# Patient Record
Sex: FEMALE | Race: WHITE | Employment: FULL TIME | URBAN - METROPOLITAN AREA
[De-identification: names, ages, dates, MRNs, and addresses within clinical notes are randomized per-mention and may not be internally consistent; named-entity substitution may affect disease eponyms.]

---

## 2017-03-02 ENCOUNTER — OFFICE VISIT (OUTPATIENT)
Dept: ORTHOPEDIC SURGERY | Age: 52
End: 2017-03-02

## 2017-03-02 ENCOUNTER — TELEPHONE (OUTPATIENT)
Dept: ORTHOPEDIC SURGERY | Age: 52
End: 2017-03-02

## 2017-03-02 VITALS
HEART RATE: 98 BPM | HEIGHT: 63 IN | SYSTOLIC BLOOD PRESSURE: 112 MMHG | DIASTOLIC BLOOD PRESSURE: 76 MMHG | BODY MASS INDEX: 23.91 KG/M2 | WEIGHT: 134.92 LBS

## 2017-03-02 DIAGNOSIS — Q76.49 BERTOLOTTI'S SYNDROME: ICD-10-CM

## 2017-03-02 DIAGNOSIS — M53.3 SACROILIAC JOINT DYSFUNCTION OF RIGHT SIDE: ICD-10-CM

## 2017-03-02 DIAGNOSIS — M47.816 SPONDYLOSIS OF LUMBAR REGION WITHOUT MYELOPATHY OR RADICULOPATHY: Primary | ICD-10-CM

## 2017-03-02 PROCEDURE — 99213 OFFICE O/P EST LOW 20 MIN: CPT | Performed by: PHYSICAL MEDICINE & REHABILITATION

## 2017-03-07 ENCOUNTER — HOSPITAL ENCOUNTER (OUTPATIENT)
Dept: PAIN MANAGEMENT | Age: 52
Discharge: HOME | End: 2017-03-07
Attending: PHYSICAL MEDICINE & REHABILITATION | Admitting: PHYSICAL MEDICINE & REHABILITATION

## 2017-03-07 VITALS
DIASTOLIC BLOOD PRESSURE: 86 MMHG | SYSTOLIC BLOOD PRESSURE: 131 MMHG | TEMPERATURE: 98.2 F | HEART RATE: 78 BPM | HEIGHT: 62 IN | BODY MASS INDEX: 24.66 KG/M2 | RESPIRATION RATE: 15 BRPM | WEIGHT: 134 LBS | OXYGEN SATURATION: 100 %

## 2017-03-07 ASSESSMENT — PAIN - FUNCTIONAL ASSESSMENT
PAIN_FUNCTIONAL_ASSESSMENT: 0-10
PAIN_FUNCTIONAL_ASSESSMENT: 0-10

## 2017-03-07 ASSESSMENT — PAIN DESCRIPTION - DESCRIPTORS: DESCRIPTORS: BURNING

## 2017-03-07 ASSESSMENT — ACTIVITIES OF DAILY LIVING (ADL): EFFECT OF PAIN ON DAILY ACTIVITIES: SITTING

## 2017-03-10 ENCOUNTER — TELEPHONE (OUTPATIENT)
Dept: ORTHOPEDIC SURGERY | Age: 52
End: 2017-03-10

## 2017-03-16 ENCOUNTER — TELEPHONE (OUTPATIENT)
Dept: ORTHOPEDIC SURGERY | Age: 52
End: 2017-03-16

## 2017-03-21 ENCOUNTER — HOSPITAL ENCOUNTER (OUTPATIENT)
Dept: PAIN MANAGEMENT | Age: 52
Discharge: HOME | End: 2017-03-21
Attending: PHYSICAL MEDICINE & REHABILITATION | Admitting: PHYSICAL MEDICINE & REHABILITATION

## 2017-03-21 VITALS
TEMPERATURE: 97 F | HEIGHT: 62 IN | BODY MASS INDEX: 24.66 KG/M2 | SYSTOLIC BLOOD PRESSURE: 116 MMHG | OXYGEN SATURATION: 100 % | RESPIRATION RATE: 15 BRPM | WEIGHT: 134 LBS | DIASTOLIC BLOOD PRESSURE: 78 MMHG | HEART RATE: 70 BPM

## 2017-03-21 ASSESSMENT — PAIN - FUNCTIONAL ASSESSMENT: PAIN_FUNCTIONAL_ASSESSMENT: 0-10

## 2017-03-21 ASSESSMENT — PAIN DESCRIPTION - DESCRIPTORS: DESCRIPTORS: BURNING

## 2017-03-24 ENCOUNTER — TELEPHONE (OUTPATIENT)
Dept: ORTHOPEDIC SURGERY | Age: 52
End: 2017-03-24

## 2017-03-27 ENCOUNTER — TELEPHONE (OUTPATIENT)
Dept: ORTHOPEDIC SURGERY | Age: 52
End: 2017-03-27

## 2017-04-04 ENCOUNTER — HOSPITAL ENCOUNTER (OUTPATIENT)
Dept: PAIN MANAGEMENT | Age: 52
Discharge: HOME | End: 2017-04-04
Attending: PHYSICAL MEDICINE & REHABILITATION | Admitting: PHYSICAL MEDICINE & REHABILITATION

## 2017-04-04 VITALS
DIASTOLIC BLOOD PRESSURE: 79 MMHG | SYSTOLIC BLOOD PRESSURE: 121 MMHG | TEMPERATURE: 97.4 F | WEIGHT: 134 LBS | BODY MASS INDEX: 24.66 KG/M2 | HEIGHT: 62 IN | RESPIRATION RATE: 16 BRPM | HEART RATE: 80 BPM | OXYGEN SATURATION: 100 %

## 2017-04-04 LAB — PREGNANCY, URINE: NEGATIVE

## 2017-04-04 RX ORDER — SODIUM CHLORIDE, SODIUM LACTATE, POTASSIUM CHLORIDE, CALCIUM CHLORIDE 600; 310; 30; 20 MG/100ML; MG/100ML; MG/100ML; MG/100ML
INJECTION, SOLUTION INTRAVENOUS
Status: COMPLETED
Start: 2017-04-04 | End: 2017-04-04

## 2017-04-04 RX ORDER — SODIUM CHLORIDE, SODIUM LACTATE, POTASSIUM CHLORIDE, CALCIUM CHLORIDE 600; 310; 30; 20 MG/100ML; MG/100ML; MG/100ML; MG/100ML
INJECTION, SOLUTION INTRAVENOUS CONTINUOUS
Status: DISCONTINUED | OUTPATIENT
Start: 2017-04-04 | End: 2017-04-05 | Stop reason: HOSPADM

## 2017-04-04 RX ORDER — LIDOCAINE HYDROCHLORIDE 10 MG/ML
INJECTION, SOLUTION EPIDURAL; INFILTRATION; INTRACAUDAL; PERINEURAL
Status: COMPLETED
Start: 2017-04-04 | End: 2017-04-04

## 2017-04-04 RX ADMIN — LIDOCAINE HYDROCHLORIDE 0.1 ML: 10 INJECTION, SOLUTION EPIDURAL; INFILTRATION; INTRACAUDAL; PERINEURAL at 07:31

## 2017-04-04 RX ADMIN — SODIUM CHLORIDE, SODIUM LACTATE, POTASSIUM CHLORIDE, CALCIUM CHLORIDE: 600; 310; 30; 20 INJECTION, SOLUTION INTRAVENOUS at 07:31

## 2017-04-04 ASSESSMENT — PAIN DESCRIPTION - DESCRIPTORS: DESCRIPTORS: BURNING

## 2017-04-04 ASSESSMENT — PAIN - FUNCTIONAL ASSESSMENT
PAIN_FUNCTIONAL_ASSESSMENT: 0-10
PAIN_FUNCTIONAL_ASSESSMENT: 0-10

## 2017-04-04 ASSESSMENT — PAIN SCALES - GENERAL: PAINLEVEL_OUTOF10: 0

## 2017-05-11 ENCOUNTER — OFFICE VISIT (OUTPATIENT)
Dept: ORTHOPEDIC SURGERY | Age: 52
End: 2017-05-11

## 2017-05-11 VITALS
BODY MASS INDEX: 26.5 KG/M2 | DIASTOLIC BLOOD PRESSURE: 74 MMHG | WEIGHT: 135 LBS | HEIGHT: 60 IN | SYSTOLIC BLOOD PRESSURE: 127 MMHG

## 2017-05-11 DIAGNOSIS — M53.3 SACROILIAC JOINT DYSFUNCTION OF RIGHT SIDE: ICD-10-CM

## 2017-05-11 DIAGNOSIS — Q76.49 BERTOLOTTI'S SYNDROME: ICD-10-CM

## 2017-05-11 DIAGNOSIS — M47.816 SPONDYLOSIS OF LUMBAR REGION WITHOUT MYELOPATHY OR RADICULOPATHY: Primary | ICD-10-CM

## 2017-05-11 PROCEDURE — 99213 OFFICE O/P EST LOW 20 MIN: CPT | Performed by: PHYSICAL MEDICINE & REHABILITATION

## 2017-11-13 ENCOUNTER — OFFICE VISIT (OUTPATIENT)
Dept: ORTHOPEDIC SURGERY | Age: 52
End: 2017-11-13

## 2017-11-13 VITALS
BODY MASS INDEX: 23.92 KG/M2 | SYSTOLIC BLOOD PRESSURE: 127 MMHG | HEIGHT: 63 IN | WEIGHT: 135 LBS | DIASTOLIC BLOOD PRESSURE: 78 MMHG

## 2017-11-13 DIAGNOSIS — M53.3 SACROILIAC JOINT DYSFUNCTION OF RIGHT SIDE: ICD-10-CM

## 2017-11-13 DIAGNOSIS — M47.816 SPONDYLOSIS OF LUMBAR REGION WITHOUT MYELOPATHY OR RADICULOPATHY: Primary | ICD-10-CM

## 2017-11-13 DIAGNOSIS — Q76.49 BERTOLOTTI'S SYNDROME: ICD-10-CM

## 2017-11-13 DIAGNOSIS — M51.36 DDD (DEGENERATIVE DISC DISEASE), LUMBAR: ICD-10-CM

## 2017-11-13 PROCEDURE — G8420 CALC BMI NORM PARAMETERS: HCPCS | Performed by: PHYSICAL MEDICINE & REHABILITATION

## 2017-11-13 PROCEDURE — G8427 DOCREV CUR MEDS BY ELIG CLIN: HCPCS | Performed by: PHYSICAL MEDICINE & REHABILITATION

## 2017-11-13 PROCEDURE — 1036F TOBACCO NON-USER: CPT | Performed by: PHYSICAL MEDICINE & REHABILITATION

## 2017-11-13 PROCEDURE — 3017F COLORECTAL CA SCREEN DOC REV: CPT | Performed by: PHYSICAL MEDICINE & REHABILITATION

## 2017-11-13 PROCEDURE — 99213 OFFICE O/P EST LOW 20 MIN: CPT | Performed by: PHYSICAL MEDICINE & REHABILITATION

## 2017-11-13 PROCEDURE — 3014F SCREEN MAMMO DOC REV: CPT | Performed by: PHYSICAL MEDICINE & REHABILITATION

## 2017-11-13 PROCEDURE — G8484 FLU IMMUNIZE NO ADMIN: HCPCS | Performed by: PHYSICAL MEDICINE & REHABILITATION

## 2017-11-13 NOTE — ADDENDUM NOTE
Encounter addended by: Yumiko Nieto MA on: 11/13/2017  2:15 PM<BR>    Actions taken: Letter status changed

## 2017-11-13 NOTE — LETTER
Please schedule the following with:     Date:       Account: [de-identified]  Patient: Vera Montague    : 1965  Address:  [place] [address] [address]    Phone (H):  [phone] (home) [phone] (work)     ----------------------------------------------------------------------------------------------  Diagnosis:     ICD-10-CM ICD-9-CM    1. Spondylosis of lumbar region without myelopathy or radiculopathy M47.816 721.3    2. DDD (degenerative disc disease), lumbar M51.36 722.52    3. Bertolotti's syndrome Q76.3 756.15    4. Sacroiliac joint dysfunction of right side M53.3 724.6          Levels: L2, L3, L4, L5 DR  on the right  Neurotomy    ----------------------------------------------------------------------------------------------  Injection #   [email or web address]    Attending Physician       John Douglass.  Karina Kramer MD.      ----------------------------------------------------------------------------------------------  Injection Scheduled For:    At:    1st Insurance:     Pre-Cert#    2nd Insurance:    Pre-Cert#    Comments:    · Infection control  · Tested positive for MRSA in past 12 months:  no  · Tested positive for MSSA \"staph infection\" in past 12 months: no  · Tested positive for VRE (Vancomycin Resistant Enterococci) in past 12 months:   no  · Currently on any antibiotics for an infection: no  · Anticoagulants:  · On a blood thinner:  no   · Any history of bleeding disorder: no   · Advanced Liver disease: no   · Advanced Renal disease: no   · Glaucoma: no   · Diabetes: no     Sedation:  Yes  -----------------------------------------------------------------------------------------------  Allergies   Allergen Reactions    Ibuprofen      Only has one kindney

## 2017-11-13 NOTE — PROGRESS NOTES
Follow up: SPINE      CHIEF COMPLAINT:    Chief Complaint   Patient presents with    Lower Back Pain     F/u LSP. Pt underwent RFA in April 2017. States relief was great until 3-4 weeks ago when pain began to return. No new injuries or symptoms.  Leg Pain     Radiating right leg pain into thigh. Pt notes pain stops at knee. HISTORY OF PRESENT ILLNESS:        The patient is a 47 y.o. female whom reports she underwent radio frequency ablation April 2017. This is undertaken on the right L2, L3-L4 and L5 dorsal ramus. She had 90% relief of her pain up until 3 weeks ago. She feels her pain is completely returned now. Does complain of some pain radiating into the posterior hamstring on the right side. She denies having any weakness or bowel bladder dysfunction. Overall her pain significantly improved after radio frequency ablation. She would like to repeat this. Pain Assessment  Location of Pain: Back (LSP)  Location Modifiers: Posterior  Severity of Pain: 5  Quality of Pain: Other (Comment) (Burning)  Duration of Pain: Persistent  Frequency of Pain: Constant  Aggravating Factors: Bending, Standing, Walking, Other (Comment) (Laying)  Limiting Behavior: Yes  Relieving Factors: Ice  Result of Injury: No  Work-Related Injury: No  Are there other pain locations you wish to document?: No    Associated signs and symptoms:   Neurogenic bowel or bladder symptoms:  no   Perceived weakness:  no   Difficulty walking:  no              Past Medical History:   History reviewed. No pertinent past medical history.    Past Surgical History:     Past Surgical History:   Procedure Laterality Date    KIDNEY DONATION Left     KNEE SURGERY  bilateral    ACL repair      Current Medications:     Current Outpatient Prescriptions:     fluticasone (FLONASE) 50 MCG/ACT nasal spray, , Disp: , Rfl: 11    levocetirizine (XYZAL) 5 MG tablet, Take 5 mg by mouth daily , Disp: , Rfl: 4    montelukast (SINGULAIR) 10 MG tablet, Take 10 mg by mouth daily , Disp: , Rfl: 11    SUMAtriptan (IMITREX) 100 MG tablet, TAKE ONE TABLET BY MOUTH AT START OF A MIGRAINE. MAY REPEAT DOSE IN 2-4 HOURS IF NEEDED. MAX DOSE OF 2 TABLETS IN 24 HOURS, Disp: 9 tablet, Rfl: 0    meclizine (ANTIVERT) 25 MG tablet, TAKE 1 TABLET BY MOUTH THREE TIMES DAILY, Disp: 30 tablet, Rfl: 1  Allergies:  Ibuprofen  Social History:    reports that she has never smoked. She has never used smokeless tobacco. She reports that she drinks alcohol. She reports that she does not use drugs. Family History:   History reviewed. No pertinent family history. REVIEW OF SYSTEMS:   CONSTITUTIONAL: Denies unexplained weight loss, fevers, chills or fatigue  NEUROLOGICAL: Denies unsteady gait or progressive weakness  MUSCULOSKELETAL: Denies joint swelling or redness  GI: Denies nausea, vomiting, diarrhea   : Denies bowel or bladder issues       PHYSICAL EXAM:    Vitals: Blood pressure 127/78, height 5' 3\" (1.6 m), weight 135 lb (61.2 kg). GENERAL EXAM:  · General Apparence: Patient is adequately groomed with no evidence of malnutrition. · Psychiatric: Orientation: The patient is oriented to time, place and person. The patient's mood and affect are appropriate   · Vascular: Examination reveals no swelling and palpation reveals no tenderness in upper or lower extremities. Good capillary refill. · The lymphatic examination of the neck, axillae and groin reveals all areas to be without enlargement or induration  · Sensation is intact without deficit in the upper and lower extremities to light touch and pinprick  · Coordination of the upper and lower extremities are normal.      LUMBAR/SACRAL EXAMINATION:  · Inspection: Local inspection shows no step-off or bruising. Lumbar alignment is normal. No instability is noted. · Palpation:   No evidence of tenderness at the midline. No tenderness bilaterally at the paraspinal or trochanters. There is no paraspinal spasm.   · Range of Motion: Increased pain with facet loading and rotation to the right  · Strength:   Strength testing is 5/5 in all muscle groups tested. · Special Tests:   Straight leg raise and crossed SLR negative. Gabriel's testing is negative bilaterally. FADIR's testing is negative bilaterally. · Skin: There are no rashes, ulcerations or lesions. · Reflexes: Reflexes are symmetrically 2+ at the patellar and ankle tendons. Clonus absent bilaterally at the feet. · Gait & station: normal, patient ambulates without assistance  · Additional Examinations:  · RIGHT LOWER EXTREMITY: Inspection/examination of the right lower extremity does not show any tenderness, deformity or injury. Range of motion is normal and pain-free. There is no gross instability. There are no rashes, ulcerations or lesions. Strength and tone are normal. No atrophy or abnormal movements are noted. · LEFT LOWER EXTREMITY:  Inspection/examination of the left lower extremity does not show any tenderness, deformity or injury. Range of motion is normal and pain-free. There is no gross instability. There are no rashes, ulcerations or lesions. Strength and tone are normal. No atrophy or abnormal movements are noted. Diagnostic Testing:    MR Lumbar spine shows 9/2017  1. Transitional lumbosacral anatomy, as described above. 2. Moderate to severe L4-L5 degenerative disc disease.  Disc bulge and   osteophyte contribute to mild bilateral neural foraminal stenosis, right   greater than left.           Results for orders placed or performed during the hospital encounter of 04/04/17   POC Pregnancy Urine Qual   Result Value Ref Range    Pregnancy, Urine Negative Detects HCG level >20 MIU/mL     Impression:       1. Spondylosis of lumbar region without myelopathy or radiculopathy    2. DDD (degenerative disc disease), lumbar    3. Bertolotti's syndrome    4. Sacroiliac joint dysfunction of right side        Plan:  Clinical Course: Worsening  1.  Medications: Continue anti-inflammatories with appropriate GI Precautions including to stop if develop dark tarry stools or GI upset and to take with food. 2. PT:  Encouraged to continue with HEP. 3. Further studies:  No further studies. 4. Interventional:  We'll set up for repeat radio frequency ablation of the right L2-L3 L4-L5 dorsal ramus. Risks include but not limited to bleeding, infection, increased pain, lack of pain relief and nerve injury. She last underwent radio frequency ablation in April 2017. This gave her greater than 90% relief of her pain for 6 months  5. Follow up:  4-6 weeks          Marsha. Belgica Lester MD, BIANKA, [facility]  Board Certified in [address] [place] [address]  Certified and Fellowship Trained in [facility] ([facility])             This dictation was performed with a verbal recognition program [facility] ) and it was checked for errors. It is possible that there are still dictated errors within this office note. If so, please bring any errors to my attention for an addendum. All efforts were made to ensure that this office note is accurate.

## 2017-12-05 ENCOUNTER — HOSPITAL ENCOUNTER (OUTPATIENT)
Dept: PAIN MANAGEMENT | Age: 52
Discharge: HOME | End: 2017-12-05
Attending: PHYSICAL MEDICINE & REHABILITATION | Admitting: PHYSICAL MEDICINE & REHABILITATION

## 2017-12-05 VITALS
WEIGHT: 130 LBS | OXYGEN SATURATION: 100 % | RESPIRATION RATE: 14 BRPM | HEART RATE: 76 BPM | TEMPERATURE: 97.1 F | BODY MASS INDEX: 23.04 KG/M2 | HEIGHT: 63 IN | SYSTOLIC BLOOD PRESSURE: 130 MMHG | DIASTOLIC BLOOD PRESSURE: 82 MMHG

## 2017-12-05 RX ORDER — SODIUM CHLORIDE, SODIUM LACTATE, POTASSIUM CHLORIDE, CALCIUM CHLORIDE 600; 310; 30; 20 MG/100ML; MG/100ML; MG/100ML; MG/100ML
INJECTION, SOLUTION INTRAVENOUS
Status: COMPLETED
Start: 2017-12-05 | End: 2017-12-05

## 2017-12-05 RX ORDER — SODIUM CHLORIDE, SODIUM LACTATE, POTASSIUM CHLORIDE, CALCIUM CHLORIDE 600; 310; 30; 20 MG/100ML; MG/100ML; MG/100ML; MG/100ML
INJECTION, SOLUTION INTRAVENOUS CONTINUOUS
Status: DISCONTINUED | OUTPATIENT
Start: 2017-12-05 | End: 2017-12-06 | Stop reason: HOSPADM

## 2017-12-05 RX ADMIN — SODIUM CHLORIDE, SODIUM LACTATE, POTASSIUM CHLORIDE, CALCIUM CHLORIDE: 600; 310; 30; 20 INJECTION, SOLUTION INTRAVENOUS at 06:24

## 2017-12-05 ASSESSMENT — PAIN - FUNCTIONAL ASSESSMENT
PAIN_FUNCTIONAL_ASSESSMENT: 0-10
PAIN_FUNCTIONAL_ASSESSMENT: 0-10

## 2017-12-05 ASSESSMENT — ACTIVITIES OF DAILY LIVING (ADL): EFFECT OF PAIN ON DAILY ACTIVITIES: SITTING, STANDING, WALKING

## 2017-12-05 ASSESSMENT — PAIN DESCRIPTION - DESCRIPTORS: DESCRIPTORS: BURNING

## 2017-12-05 NOTE — PROGRESS NOTES
Radiofrequency Ablation   Dr Nj Cover Injection Note    Sight marked/ confirmed with x-ray: Yes  Position: Prone with pillow under head, abdomen and pad under ankles  Prepped with: Chloraprep    Isovue-M-300-3ml  Depomedrol (80 mg/ml)-1ml  Marcaine: 0.5%- 4ml  Lidocaine 2%-4ml  Lidocaine 1%-4ml    Monitor by: Clotilde Blackmon RN  C - Arm operated by: Annika Abarca RT  Tech: LILIYA BOSCH  Prepped by: Dr. Jennifer Burton

## 2018-01-10 ENCOUNTER — OFFICE VISIT (OUTPATIENT)
Dept: ORTHOPEDIC SURGERY | Age: 53
End: 2018-01-10

## 2018-01-10 VITALS
BODY MASS INDEX: 23.05 KG/M2 | SYSTOLIC BLOOD PRESSURE: 127 MMHG | DIASTOLIC BLOOD PRESSURE: 78 MMHG | HEIGHT: 63 IN | WEIGHT: 130.07 LBS

## 2018-01-10 DIAGNOSIS — S76.311A HAMSTRING MUSCLE STRAIN, RIGHT, INITIAL ENCOUNTER: Primary | ICD-10-CM

## 2018-01-10 DIAGNOSIS — M47.816 SPONDYLOSIS OF LUMBAR REGION WITHOUT MYELOPATHY OR RADICULOPATHY: ICD-10-CM

## 2018-01-10 PROCEDURE — 3017F COLORECTAL CA SCREEN DOC REV: CPT | Performed by: PHYSICAL MEDICINE & REHABILITATION

## 2018-01-10 PROCEDURE — G8420 CALC BMI NORM PARAMETERS: HCPCS | Performed by: PHYSICAL MEDICINE & REHABILITATION

## 2018-01-10 PROCEDURE — G8484 FLU IMMUNIZE NO ADMIN: HCPCS | Performed by: PHYSICAL MEDICINE & REHABILITATION

## 2018-01-10 PROCEDURE — 99213 OFFICE O/P EST LOW 20 MIN: CPT | Performed by: PHYSICAL MEDICINE & REHABILITATION

## 2018-01-10 PROCEDURE — 1036F TOBACCO NON-USER: CPT | Performed by: PHYSICAL MEDICINE & REHABILITATION

## 2018-01-10 PROCEDURE — G8427 DOCREV CUR MEDS BY ELIG CLIN: HCPCS | Performed by: PHYSICAL MEDICINE & REHABILITATION

## 2018-01-10 PROCEDURE — 3014F SCREEN MAMMO DOC REV: CPT | Performed by: PHYSICAL MEDICINE & REHABILITATION

## 2018-06-14 ENCOUNTER — TELEPHONE (OUTPATIENT)
Dept: ORTHOPEDIC SURGERY | Age: 53
End: 2018-06-14

## 2018-06-14 ENCOUNTER — OFFICE VISIT (OUTPATIENT)
Dept: ORTHOPEDIC SURGERY | Age: 53
End: 2018-06-14

## 2018-06-14 VITALS
WEIGHT: 135 LBS | HEIGHT: 63 IN | DIASTOLIC BLOOD PRESSURE: 69 MMHG | SYSTOLIC BLOOD PRESSURE: 120 MMHG | BODY MASS INDEX: 23.92 KG/M2

## 2018-06-14 DIAGNOSIS — M47.816 SPONDYLOSIS OF LUMBAR REGION WITHOUT MYELOPATHY OR RADICULOPATHY: Primary | ICD-10-CM

## 2018-06-14 DIAGNOSIS — Q76.49 BERTOLOTTI'S SYNDROME: ICD-10-CM

## 2018-06-14 PROCEDURE — G8420 CALC BMI NORM PARAMETERS: HCPCS | Performed by: PHYSICAL MEDICINE & REHABILITATION

## 2018-06-14 PROCEDURE — G8427 DOCREV CUR MEDS BY ELIG CLIN: HCPCS | Performed by: PHYSICAL MEDICINE & REHABILITATION

## 2018-06-14 PROCEDURE — 99213 OFFICE O/P EST LOW 20 MIN: CPT | Performed by: PHYSICAL MEDICINE & REHABILITATION

## 2018-06-14 PROCEDURE — 3017F COLORECTAL CA SCREEN DOC REV: CPT | Performed by: PHYSICAL MEDICINE & REHABILITATION

## 2018-06-14 PROCEDURE — 1036F TOBACCO NON-USER: CPT | Performed by: PHYSICAL MEDICINE & REHABILITATION

## 2018-06-27 ENCOUNTER — TELEPHONE (OUTPATIENT)
Dept: ORTHOPEDIC SURGERY | Age: 53
End: 2018-06-27

## 2018-06-29 ENCOUNTER — PAT TELEPHONE (OUTPATIENT)
Dept: PREADMISSION TESTING | Age: 53
End: 2018-06-29

## 2018-06-29 RX ORDER — SODIUM CHLORIDE, SODIUM LACTATE, POTASSIUM CHLORIDE, CALCIUM CHLORIDE 600; 310; 30; 20 MG/100ML; MG/100ML; MG/100ML; MG/100ML
INJECTION, SOLUTION INTRAVENOUS CONTINUOUS
Status: CANCELLED | OUTPATIENT
Start: 2018-07-03 | End: 2019-07-02

## 2018-07-03 ENCOUNTER — HOSPITAL ENCOUNTER (OUTPATIENT)
Dept: PAIN MANAGEMENT | Age: 53
Discharge: HOME | End: 2018-07-03
Attending: PHYSICAL MEDICINE & REHABILITATION | Admitting: PHYSICAL MEDICINE & REHABILITATION

## 2018-07-03 VITALS
HEIGHT: 63 IN | OXYGEN SATURATION: 100 % | WEIGHT: 135 LBS | RESPIRATION RATE: 15 BRPM | DIASTOLIC BLOOD PRESSURE: 92 MMHG | HEART RATE: 89 BPM | SYSTOLIC BLOOD PRESSURE: 122 MMHG | TEMPERATURE: 97 F | BODY MASS INDEX: 23.92 KG/M2

## 2018-07-03 RX ORDER — SODIUM CHLORIDE, SODIUM LACTATE, POTASSIUM CHLORIDE, CALCIUM CHLORIDE 600; 310; 30; 20 MG/100ML; MG/100ML; MG/100ML; MG/100ML
INJECTION, SOLUTION INTRAVENOUS CONTINUOUS
Status: DISCONTINUED | OUTPATIENT
Start: 2018-07-03 | End: 2018-07-04 | Stop reason: HOSPADM

## 2018-07-03 RX ORDER — LIDOCAINE HYDROCHLORIDE 10 MG/ML
INJECTION, SOLUTION EPIDURAL; INFILTRATION; INTRACAUDAL; PERINEURAL
Status: COMPLETED
Start: 2018-07-03 | End: 2018-07-03

## 2018-07-03 RX ADMIN — LIDOCAINE HYDROCHLORIDE 0.1 ML: 10 INJECTION, SOLUTION EPIDURAL; INFILTRATION; INTRACAUDAL; PERINEURAL at 07:17

## 2018-07-03 RX ADMIN — SODIUM CHLORIDE, SODIUM LACTATE, POTASSIUM CHLORIDE, CALCIUM CHLORIDE: 600; 310; 30; 20 INJECTION, SOLUTION INTRAVENOUS at 07:18

## 2018-07-03 ASSESSMENT — ACTIVITIES OF DAILY LIVING (ADL): EFFECT OF PAIN ON DAILY ACTIVITIES: ANY ACTIVITY INCREASES PAIN

## 2018-07-03 ASSESSMENT — PAIN - FUNCTIONAL ASSESSMENT
PAIN_FUNCTIONAL_ASSESSMENT: 0-10
PAIN_FUNCTIONAL_ASSESSMENT: 0-10

## 2018-07-03 ASSESSMENT — PAIN DESCRIPTION - DESCRIPTORS: DESCRIPTORS: SHARP;BURNING

## 2018-07-03 NOTE — OP NOTE
Patient:  Carlye Goldberg  YOB: 1965  Medical Record #:  [id number]   Place:   [address] [place], [place]  Date:  7/3/2018   Physician:  Mayi Galaviz MD    Procedure: Radiofrequency ablation of the Right L2, L3, L4 Medial branches and L5 Dorsal ramus     Pre-Procedure Diagnosis: Lumbar spondylosis without radiculopathy     Post-Procedure Diagnosis: Same     Sedation: Local with 1% Lidocaine 5 ml and 1 mg of IV Versed     EBL: None     Complications: None     Procedure Summary:     The patient was seen in the office for complaints of low back pain. Review of the imaging and physical exam of the patient confirmed the pre-procedure diagnosis. After a thorough discussion of risks, benefits and alternatives informed consent was obtained. The patient was brought to the procedure suite and placed in the prone position. The skin overlying the lumbar spine was prepped with chloraprep and draped in the usual sterile fashion. Using fluoroscopic guidance, the right L3, L4, L5 and sacral ala levels were identified. Through anesthetized skin, a, 18 gauge 100 mm RFL needle with a 10 mm active tip was advanced to the juncture of the superior articular process and the transverse process at the right L3 level where the right L2 medial branch resides. After the probe was instilled, motor testing took place up to 2 V without any paresthesia into the right leg. Then ablation took place at 80 C for 90 seconds. This was repeated for the right L4, L5 and sacral ala levels corresponding to the right L3, L4 medial branches and L5 Dorsal ramus. Following the ablation, 20 mg of depomedrol mixed with 0.5% Marcaine was instilled at each level.  The needles were removed and a band-aid was applied at each level.     The patient was transferred to the post-operative area in stable condition.

## 2018-07-03 NOTE — PROGRESS NOTES
RADIOFREQUENY LUMBAR 2-5  DR ROBBY    ISOVUE  LIDOCAINE1%  LIDOCAINE 2%      RAIZA CERVANTES RN  C CHARANJIT WOO  T KIMBERLEY JORGENSEN RN

## 2018-07-03 NOTE — H&P
HISTORY AND PHYSICAL/PRE-SEDATION ASSESSMENT    Patient:  Leatha Romero   :  1965  Medical Record No.:  [id number]   Date:  7/3/2018  Physician:  Sebastián Saldivar M.D. Facility: [address] [place] [address] History and Physical reviewed and agreed upon. Additional findings:    Allergies:  Ibuprofen    Home Medications:    Prior to Admission medications    Medication Sig Start Date End Date Taking? Authorizing Provider   levocetirizine (XYZAL) 5 MG tablet Take 5 mg by mouth daily  11/9/15  Yes Historical Provider, MD   montelukast (SINGULAIR) 10 MG tablet Take 10 mg by mouth daily  11/9/15  Yes Historical Provider, MD   SUMAtriptan (IMITREX) 100 MG tablet TAKE ONE TABLET BY MOUTH AT START OF A MIGRAINE. MAY REPEAT DOSE IN 2-4 HOURS IF NEEDED. MAX DOSE OF 2 TABLETS IN 24 HOURS 10/14/15  Yes Rafael Foy DO   meclizine (ANTIVERT) 25 MG tablet TAKE 1 TABLET BY MOUTH THREE TIMES DAILY 14  Yes Rafael Foy DO   fluticasone [facility]) 50 MCG/ACT nasal spray  9/15/15   Historical Provider, MD       Vitals: Stable       PHYSICAL EXAM:  HENT: Airway patent and reviewed  Cardiovascular: Normal rate, regular rhythm, normal heart sounds. Pulmonary/Chest: No wheezes. No rhonchi. No rales. Abdominal: Soft. Bowel sounds are normal. No distension. ASA CLASS:         []   I. Normal, healthy adult           [x]   II.  Mild systemic disease            []   III. Severe systemic disease      Sedation plan:   [x]  Local              []  Minimal                  []  General anesthesia    Patient's condition acceptable for planned procedure/sedation. Post Procedure Plan   Return to same level of care   ______________________     The risks and benefits as well as alternatives to the procedure have been discussed with the patient and or family. The patient and or next of kin understands and agrees to proceed.     Sebastián Saldivar M.D.

## 2018-10-31 ENCOUNTER — OFFICE VISIT (OUTPATIENT)
Dept: ORTHOPEDIC SURGERY | Age: 53
End: 2018-10-31
Payer: COMMERCIAL

## 2018-10-31 VITALS
WEIGHT: 135 LBS | HEIGHT: 63 IN | BODY MASS INDEX: 23.92 KG/M2 | SYSTOLIC BLOOD PRESSURE: 122 MMHG | DIASTOLIC BLOOD PRESSURE: 68 MMHG

## 2018-10-31 DIAGNOSIS — S76.391A AVULSION OF RIGHT HAMSTRING MUSCLE, INITIAL ENCOUNTER: Primary | ICD-10-CM

## 2018-10-31 DIAGNOSIS — M47.816 SPONDYLOSIS OF LUMBAR REGION WITHOUT MYELOPATHY OR RADICULOPATHY: ICD-10-CM

## 2018-10-31 DIAGNOSIS — M25.512 CHRONIC LEFT SHOULDER PAIN: ICD-10-CM

## 2018-10-31 DIAGNOSIS — S29.019A THORACIC MYOFASCIAL STRAIN, INITIAL ENCOUNTER: ICD-10-CM

## 2018-10-31 DIAGNOSIS — G89.29 CHRONIC LEFT SHOULDER PAIN: ICD-10-CM

## 2018-10-31 PROCEDURE — 99214 OFFICE O/P EST MOD 30 MIN: CPT | Performed by: PHYSICAL MEDICINE & REHABILITATION

## 2018-10-31 PROCEDURE — 3017F COLORECTAL CA SCREEN DOC REV: CPT | Performed by: PHYSICAL MEDICINE & REHABILITATION

## 2018-10-31 PROCEDURE — G8427 DOCREV CUR MEDS BY ELIG CLIN: HCPCS | Performed by: PHYSICAL MEDICINE & REHABILITATION

## 2018-10-31 PROCEDURE — G8420 CALC BMI NORM PARAMETERS: HCPCS | Performed by: PHYSICAL MEDICINE & REHABILITATION

## 2018-10-31 PROCEDURE — G8484 FLU IMMUNIZE NO ADMIN: HCPCS | Performed by: PHYSICAL MEDICINE & REHABILITATION

## 2018-10-31 PROCEDURE — 1036F TOBACCO NON-USER: CPT | Performed by: PHYSICAL MEDICINE & REHABILITATION

## 2018-10-31 NOTE — PROGRESS NOTES
patient is oriented to time, place and person. The patient's mood and affect are appropriate   · Vascular: Examination reveals no swelling and palpation reveals no tenderness in upper or lower extremities. Good capillary refill. · The lymphatic examination of the neck, axillae and groin reveals all areas to be without enlargement or induration  · Sensation is intact without deficit in the upper and lower extremities to light touch and pinprick  · Coordination of the upper and lower extremities are normal.    CERVICAL/THORACIC EXAMINATION:  · Inspection: Local inspection shows no step-off or bruising. Cervical alignment is normal. No instability is noted. · Palpation and Percussion: No evidence of tenderness at the midline. Paraspinal tenderness is present. There is no paraspinal spasm. Skin:There are no rashes, ulcerations or lesions  · Range of Motion:  limited by 50% in all planes due to pain (Tspine)  · Strength: 5/5 bilateral upper extremities  · Special Tests:   Spurling's and Becker's are negative bilaterally. Irene and Impingement tests are negative bilaterally. · Skin:There are no rashes, ulcerations or lesions in right & left upper extremities. · Reflexes: Bilaterally triceps, biceps and brachioradialis are 2+. Clonus absent bilaterally at the feet. No pathological reflexes are noted. · Gait & station: normal, patient ambulates without assistance  · Additional Examinations:  · RIGHT UPPER EXTREMITY:  Inspection/examination of the right upper extremity does not show any tenderness, deformity or injury. Range of motion is unremarkable and pain-free. There is no gross instability. There are no rashes, ulcerations or lesions. Strength and tone are normal. No atrophy or abnormal movements are noted. · LEFT UPPER EXTREMITY: Inspection/examination of the left upper extremity does not show any tenderness, deformity or injury. Range of motion is unremarkable and pain-free.  There is no gross

## 2018-11-07 ENCOUNTER — OFFICE VISIT (OUTPATIENT)
Dept: ORTHOPEDIC SURGERY | Age: 53
End: 2018-11-07
Payer: COMMERCIAL

## 2018-11-07 VITALS
WEIGHT: 135 LBS | DIASTOLIC BLOOD PRESSURE: 70 MMHG | SYSTOLIC BLOOD PRESSURE: 122 MMHG | HEIGHT: 63 IN | BODY MASS INDEX: 23.92 KG/M2

## 2018-11-07 DIAGNOSIS — M76.891 TENDINITIS OF RIGHT HIP FLEXOR: Primary | ICD-10-CM

## 2018-11-07 PROCEDURE — G8420 CALC BMI NORM PARAMETERS: HCPCS | Performed by: PHYSICAL MEDICINE & REHABILITATION

## 2018-11-07 PROCEDURE — G8427 DOCREV CUR MEDS BY ELIG CLIN: HCPCS | Performed by: PHYSICAL MEDICINE & REHABILITATION

## 2018-11-07 PROCEDURE — 3017F COLORECTAL CA SCREEN DOC REV: CPT | Performed by: PHYSICAL MEDICINE & REHABILITATION

## 2018-11-07 PROCEDURE — 1036F TOBACCO NON-USER: CPT | Performed by: PHYSICAL MEDICINE & REHABILITATION

## 2018-11-07 PROCEDURE — G8484 FLU IMMUNIZE NO ADMIN: HCPCS | Performed by: PHYSICAL MEDICINE & REHABILITATION

## 2018-11-07 PROCEDURE — 99213 OFFICE O/P EST LOW 20 MIN: CPT | Performed by: PHYSICAL MEDICINE & REHABILITATION

## 2018-12-18 ENCOUNTER — TELEPHONE (OUTPATIENT)
Dept: ORTHOPEDIC SURGERY | Age: 53
End: 2018-12-18

## 2018-12-26 ENCOUNTER — OFFICE VISIT (OUTPATIENT)
Dept: ORTHOPEDIC SURGERY | Age: 53
End: 2018-12-26
Payer: COMMERCIAL

## 2018-12-26 VITALS
BODY MASS INDEX: 23.92 KG/M2 | HEART RATE: 79 BPM | SYSTOLIC BLOOD PRESSURE: 103 MMHG | WEIGHT: 135 LBS | HEIGHT: 63 IN | DIASTOLIC BLOOD PRESSURE: 68 MMHG

## 2018-12-26 DIAGNOSIS — M25.512 LEFT SHOULDER PAIN, UNSPECIFIED CHRONICITY: Primary | ICD-10-CM

## 2018-12-26 PROCEDURE — 99213 OFFICE O/P EST LOW 20 MIN: CPT | Performed by: ORTHOPAEDIC SURGERY

## 2018-12-26 PROCEDURE — G8427 DOCREV CUR MEDS BY ELIG CLIN: HCPCS | Performed by: ORTHOPAEDIC SURGERY

## 2018-12-26 PROCEDURE — G8420 CALC BMI NORM PARAMETERS: HCPCS | Performed by: ORTHOPAEDIC SURGERY

## 2018-12-26 PROCEDURE — 1036F TOBACCO NON-USER: CPT | Performed by: ORTHOPAEDIC SURGERY

## 2018-12-26 PROCEDURE — 3017F COLORECTAL CA SCREEN DOC REV: CPT | Performed by: ORTHOPAEDIC SURGERY

## 2018-12-26 PROCEDURE — G8484 FLU IMMUNIZE NO ADMIN: HCPCS | Performed by: ORTHOPAEDIC SURGERY

## 2019-01-03 ENCOUNTER — TELEPHONE (OUTPATIENT)
Dept: ORTHOPEDIC SURGERY | Age: 54
End: 2019-01-03

## 2019-01-14 ENCOUNTER — TELEPHONE (OUTPATIENT)
Dept: ORTHOPEDIC SURGERY | Age: 54
End: 2019-01-14

## 2019-01-15 ENCOUNTER — HOSPITAL ENCOUNTER (OUTPATIENT)
Age: 54
Setting detail: OUTPATIENT SURGERY
Discharge: HOME | End: 2019-01-15
Attending: PHYSICAL MEDICINE & REHABILITATION | Admitting: PHYSICAL MEDICINE & REHABILITATION
Payer: COMMERCIAL

## 2019-01-15 VITALS
OXYGEN SATURATION: 100 % | HEART RATE: 78 BPM | HEIGHT: 63 IN | SYSTOLIC BLOOD PRESSURE: 111 MMHG | BODY MASS INDEX: 23.92 KG/M2 | TEMPERATURE: 97.2 F | DIASTOLIC BLOOD PRESSURE: 74 MMHG | RESPIRATION RATE: 14 BRPM | WEIGHT: 135 LBS

## 2019-01-15 PROCEDURE — 99152 MOD SED SAME PHYS/QHP 5/>YRS: CPT | Performed by: PHYSICAL MEDICINE & REHABILITATION

## 2019-01-15 PROCEDURE — 99153 MOD SED SAME PHYS/QHP EA: CPT | Performed by: PHYSICAL MEDICINE & REHABILITATION

## 2019-01-15 PROCEDURE — 2500000003 HC RX 250 WO HCPCS: Performed by: PHYSICAL MEDICINE & REHABILITATION

## 2019-01-15 PROCEDURE — 2580000003 HC RX 258: Performed by: PHYSICAL MEDICINE & REHABILITATION

## 2019-01-15 PROCEDURE — 2709999900 HC NON-CHARGEABLE SUPPLY: Performed by: PHYSICAL MEDICINE & REHABILITATION

## 2019-01-15 PROCEDURE — 7100000011 HC PHASE II RECOVERY - ADDTL 15 MIN: Performed by: PHYSICAL MEDICINE & REHABILITATION

## 2019-01-15 PROCEDURE — 3600000012 HC SURGERY LEVEL 2 ADDTL 15MIN: Performed by: PHYSICAL MEDICINE & REHABILITATION

## 2019-01-15 PROCEDURE — 6360000004 HC RX CONTRAST MEDICATION: Performed by: PHYSICAL MEDICINE & REHABILITATION

## 2019-01-15 PROCEDURE — 3600000002 HC SURGERY LEVEL 2 BASE: Performed by: PHYSICAL MEDICINE & REHABILITATION

## 2019-01-15 PROCEDURE — 7100000010 HC PHASE II RECOVERY - FIRST 15 MIN: Performed by: PHYSICAL MEDICINE & REHABILITATION

## 2019-01-15 PROCEDURE — 6360000002 HC RX W HCPCS: Performed by: PHYSICAL MEDICINE & REHABILITATION

## 2019-01-15 RX ORDER — SODIUM CHLORIDE, SODIUM LACTATE, POTASSIUM CHLORIDE, CALCIUM CHLORIDE 600; 310; 30; 20 MG/100ML; MG/100ML; MG/100ML; MG/100ML
INJECTION, SOLUTION INTRAVENOUS ONCE
Status: COMPLETED | OUTPATIENT
Start: 2019-01-15 | End: 2019-01-15

## 2019-01-15 RX ORDER — BUPIVACAINE HYDROCHLORIDE 5 MG/ML
INJECTION, SOLUTION EPIDURAL; INTRACAUDAL PRN
Status: DISCONTINUED | OUTPATIENT
Start: 2019-01-15 | End: 2019-01-15 | Stop reason: HOSPADM

## 2019-01-15 RX ORDER — LIDOCAINE HYDROCHLORIDE 20 MG/ML
INJECTION, SOLUTION EPIDURAL; INFILTRATION; INTRACAUDAL; PERINEURAL PRN
Status: DISCONTINUED | OUTPATIENT
Start: 2019-01-15 | End: 2019-01-15 | Stop reason: HOSPADM

## 2019-01-15 RX ORDER — METHYLPREDNISOLONE ACETATE 80 MG/ML
INJECTION, SUSPENSION INTRA-ARTICULAR; INTRALESIONAL; INTRAMUSCULAR; SOFT TISSUE PRN
Status: DISCONTINUED | OUTPATIENT
Start: 2019-01-15 | End: 2019-01-15 | Stop reason: HOSPADM

## 2019-01-15 RX ORDER — MIDAZOLAM HYDROCHLORIDE 1 MG/ML
INJECTION INTRAMUSCULAR; INTRAVENOUS PRN
Status: DISCONTINUED | OUTPATIENT
Start: 2019-01-15 | End: 2019-01-15 | Stop reason: HOSPADM

## 2019-01-15 RX ADMIN — LIDOCAINE HYDROCHLORIDE 0.1 ML: 10 INJECTION, SOLUTION EPIDURAL; INFILTRATION; INTRACAUDAL; PERINEURAL at 07:16

## 2019-01-15 RX ADMIN — SODIUM CHLORIDE, POTASSIUM CHLORIDE, SODIUM LACTATE AND CALCIUM CHLORIDE: 600; 310; 30; 20 INJECTION, SOLUTION INTRAVENOUS at 07:16

## 2019-01-15 ASSESSMENT — PAIN - FUNCTIONAL ASSESSMENT
PAIN_FUNCTIONAL_ASSESSMENT: 0-10
PAIN_FUNCTIONAL_ASSESSMENT: PREVENTS OR INTERFERES WITH MANY ACTIVE NOT PASSIVE ACTIVITIES

## 2019-01-15 ASSESSMENT — PAIN DESCRIPTION - DESCRIPTORS: DESCRIPTORS: BURNING

## 2019-01-15 ASSESSMENT — PAIN SCALES - GENERAL: PAINLEVEL_OUTOF10: 0

## 2019-07-11 ENCOUNTER — OFFICE VISIT (OUTPATIENT)
Dept: ORTHOPEDIC SURGERY | Age: 54
End: 2019-07-11
Payer: COMMERCIAL

## 2019-07-11 VITALS
SYSTOLIC BLOOD PRESSURE: 120 MMHG | HEIGHT: 63 IN | BODY MASS INDEX: 23.91 KG/M2 | WEIGHT: 134.92 LBS | DIASTOLIC BLOOD PRESSURE: 80 MMHG

## 2019-07-11 DIAGNOSIS — M47.816 SPONDYLOSIS OF LUMBAR REGION WITHOUT MYELOPATHY OR RADICULOPATHY: Primary | ICD-10-CM

## 2019-07-11 DIAGNOSIS — M51.36 DDD (DEGENERATIVE DISC DISEASE), LUMBAR: ICD-10-CM

## 2019-07-11 DIAGNOSIS — M48.061 LUMBAR FORAMINAL STENOSIS: ICD-10-CM

## 2019-07-11 PROCEDURE — 1036F TOBACCO NON-USER: CPT | Performed by: PHYSICAL MEDICINE & REHABILITATION

## 2019-07-11 PROCEDURE — 3017F COLORECTAL CA SCREEN DOC REV: CPT | Performed by: PHYSICAL MEDICINE & REHABILITATION

## 2019-07-11 PROCEDURE — 99214 OFFICE O/P EST MOD 30 MIN: CPT | Performed by: PHYSICAL MEDICINE & REHABILITATION

## 2019-07-11 PROCEDURE — G8420 CALC BMI NORM PARAMETERS: HCPCS | Performed by: PHYSICAL MEDICINE & REHABILITATION

## 2019-07-11 PROCEDURE — G8427 DOCREV CUR MEDS BY ELIG CLIN: HCPCS | Performed by: PHYSICAL MEDICINE & REHABILITATION

## 2019-07-11 NOTE — PROGRESS NOTES
Inspection/examination of the right upper extremity does not show any tenderness, deformity or injury. Range of motion is unremarkable and pain-free. There is no gross instability. There are no rashes, ulcerations or lesions. Strength and tone are normal. No atrophy or abnormal movements are noted. · LEFT UPPER EXTREMITY: Inspection/examination of the left upper extremity does not show any tenderness, deformity or injury. Range of motion is unremarkable and pain-free. There is no gross instability. There are no rashes, ulcerations or lesions. Strength and tone are normal. No atrophy or abnormal movements are noted. LUMBAR/SACRAL EXAMINATION:  · Inspection: Local inspection shows no step-off or bruising. Lumbar alignment is normal. No instability is noted. · Palpation:   No evidence of tenderness at the midline. Lumbar paraspinal tenderness: Mild L4/5 and L5/S1 tenderness  Bursal tenderness No tenderness bilaterally  There is no paraspinal spasm. · Range of Motion: Increased pain facet loading to the right side  · Strength:   Strength testing is 5/5 in all muscle groups tested. · Special Tests:   Straight leg raise and crossed SLR negative. Gabriel's testing is negative bilaterally. FADIR's testing is negative bilaterally. · Skin: There are no rashes, ulcerations or lesions. · Reflexes: Reflexes are symmetrically 2+ at the patellar and ankle tendons. Clonus absent bilaterally at the feet. · Gait & station: normal, patient ambulates without assistance and no ataxia  · Additional Examinations:  · RIGHT LOWER EXTREMITY: Inspection/examination of the right lower extremity does not show any tenderness, deformity or injury. Range of motion is normal and pain-free. There is no gross instability. There are no rashes, ulcerations or lesions. Strength and tone are normal. No atrophy or abnormal movements are noted.   · LEFT LOWER EXTREMITY:  Inspection/examination of the left lower extremity does not show any symptoms worsen or if new symptoms appear prior to the next scheduled visit. She is specifically instructed to contact the office between now & her scheduled appointment if she has concerns related to her condition or if she needs assistance in scheduling the above tests. She is welcome to call for an appointment sooner if she has any additional concerns or questions. I, Dr. Familia Cabral. Angela, personally performed the services described in this documentation as scribed by Nohemy Okeefe MA, in my presence and it is both accurate and complete. Nadia Martinez. Gloria Ely MD, BIANKA, [facility]  Board Certified in [address] [place] [address]  Certified and Fellowship Trained in [facility] ([facility])             This dictation was performed with a verbal recognition program [facility]) and it was checked for errors. It is possible that there are still dictated errors within this office note. If so, please bring any errors to my attention for an addendum. All efforts were made to ensure that this office note is accurate.

## 2019-08-02 ENCOUNTER — TELEPHONE (OUTPATIENT)
Dept: ORTHOPEDIC SURGERY | Age: 54
End: 2019-08-02

## 2019-08-06 ENCOUNTER — HOSPITAL ENCOUNTER (OUTPATIENT)
Age: 54
Setting detail: OUTPATIENT SURGERY
Discharge: HOME | End: 2019-08-06
Attending: PHYSICAL MEDICINE & REHABILITATION | Admitting: PHYSICAL MEDICINE & REHABILITATION
Payer: COMMERCIAL

## 2019-08-06 VITALS
SYSTOLIC BLOOD PRESSURE: 132 MMHG | BODY MASS INDEX: 23.74 KG/M2 | HEART RATE: 73 BPM | RESPIRATION RATE: 20 BRPM | WEIGHT: 134 LBS | OXYGEN SATURATION: 100 % | TEMPERATURE: 97.4 F | DIASTOLIC BLOOD PRESSURE: 77 MMHG | HEIGHT: 63 IN

## 2019-08-06 PROCEDURE — 2500000003 HC RX 250 WO HCPCS: Performed by: PHYSICAL MEDICINE & REHABILITATION

## 2019-08-06 PROCEDURE — 6360000002 HC RX W HCPCS: Performed by: PHYSICAL MEDICINE & REHABILITATION

## 2019-08-06 PROCEDURE — 2709999900 HC NON-CHARGEABLE SUPPLY: Performed by: PHYSICAL MEDICINE & REHABILITATION

## 2019-08-06 PROCEDURE — 7100000010 HC PHASE II RECOVERY - FIRST 15 MIN: Performed by: PHYSICAL MEDICINE & REHABILITATION

## 2019-08-06 PROCEDURE — 3600000012 HC SURGERY LEVEL 2 ADDTL 15MIN: Performed by: PHYSICAL MEDICINE & REHABILITATION

## 2019-08-06 PROCEDURE — 99153 MOD SED SAME PHYS/QHP EA: CPT | Performed by: PHYSICAL MEDICINE & REHABILITATION

## 2019-08-06 PROCEDURE — 7100000011 HC PHASE II RECOVERY - ADDTL 15 MIN: Performed by: PHYSICAL MEDICINE & REHABILITATION

## 2019-08-06 PROCEDURE — 99152 MOD SED SAME PHYS/QHP 5/>YRS: CPT | Performed by: PHYSICAL MEDICINE & REHABILITATION

## 2019-08-06 PROCEDURE — 3600000002 HC SURGERY LEVEL 2 BASE: Performed by: PHYSICAL MEDICINE & REHABILITATION

## 2019-08-06 PROCEDURE — 2580000003 HC RX 258: Performed by: PHYSICAL MEDICINE & REHABILITATION

## 2019-08-06 RX ORDER — MIDAZOLAM HYDROCHLORIDE 1 MG/ML
INJECTION INTRAMUSCULAR; INTRAVENOUS PRN
Status: DISCONTINUED | OUTPATIENT
Start: 2019-08-06 | End: 2019-08-06 | Stop reason: ALTCHOICE

## 2019-08-06 RX ORDER — LIDOCAINE HYDROCHLORIDE 20 MG/ML
INJECTION, SOLUTION EPIDURAL; INFILTRATION; INTRACAUDAL; PERINEURAL PRN
Status: DISCONTINUED | OUTPATIENT
Start: 2019-08-06 | End: 2019-08-06 | Stop reason: ALTCHOICE

## 2019-08-06 RX ORDER — ROSUVASTATIN CALCIUM 5 MG/1
5 TABLET, COATED ORAL DAILY
COMMUNITY

## 2019-08-06 RX ORDER — LIDOCAINE HYDROCHLORIDE 10 MG/ML
INJECTION, SOLUTION INFILTRATION; PERINEURAL PRN
Status: DISCONTINUED | OUTPATIENT
Start: 2019-08-06 | End: 2019-08-06 | Stop reason: ALTCHOICE

## 2019-08-06 RX ORDER — SODIUM CHLORIDE, SODIUM LACTATE, POTASSIUM CHLORIDE, CALCIUM CHLORIDE 600; 310; 30; 20 MG/100ML; MG/100ML; MG/100ML; MG/100ML
INJECTION, SOLUTION INTRAVENOUS ONCE
Status: COMPLETED | OUTPATIENT
Start: 2019-08-06 | End: 2019-08-06

## 2019-08-06 RX ADMIN — LIDOCAINE HYDROCHLORIDE 0.1 ML: 10 INJECTION, SOLUTION EPIDURAL; INFILTRATION; INTRACAUDAL; PERINEURAL at 07:20

## 2019-08-06 RX ADMIN — SODIUM CHLORIDE, POTASSIUM CHLORIDE, SODIUM LACTATE AND CALCIUM CHLORIDE: 600; 310; 30; 20 INJECTION, SOLUTION INTRAVENOUS at 07:20

## 2019-08-06 ASSESSMENT — PAIN SCALES - GENERAL
PAINLEVEL_OUTOF10: 1
PAINLEVEL_OUTOF10: 0

## 2019-08-06 ASSESSMENT — PAIN DESCRIPTION - PAIN TYPE: TYPE: CHRONIC PAIN

## 2019-08-06 ASSESSMENT — PAIN - FUNCTIONAL ASSESSMENT
PAIN_FUNCTIONAL_ASSESSMENT: 0-10
PAIN_FUNCTIONAL_ASSESSMENT: PREVENTS OR INTERFERES SOME ACTIVE ACTIVITIES AND ADLS

## 2019-08-06 ASSESSMENT — PAIN DESCRIPTION - FREQUENCY: FREQUENCY: CONTINUOUS

## 2019-08-06 ASSESSMENT — PAIN DESCRIPTION - ONSET: ONSET: ON-GOING

## 2019-08-06 ASSESSMENT — PAIN DESCRIPTION - DESCRIPTORS: DESCRIPTORS: BURNING

## 2019-08-06 NOTE — OP NOTE
Patient:  Rito Whitman  YOB: 1965  Medical Record #:  [id number]   Place:   [address], [place]  Date:  8/6/2019   Physician:  Virginia Mckeon MD, BIANKA    Procedure: Radiofrequency ablation of the Right L2, L3, L4 Medial branches and L5 Dorsal ramus     Pre-Procedure Diagnosis: Lumbar spondylosis without radiculopathy     Post-Procedure Diagnosis: Same     Sedation: Local with 1% Lidocaine 5 ml and 2 mg of IV Versed     EBL: None     Complications: None     Procedure Summary:     The patient was seen in the office for complaints of low back pain. Review of the imaging and physical exam of the patient confirmed the pre-procedure diagnosis. After a thorough discussion of risks, benefits and alternatives informed consent was obtained. The patient was brought to the procedure suite and placed in the prone position. The skin overlying the lumbar spine was prepped with chloraprep and draped in the usual sterile fashion. Using fluoroscopic guidance, the right L3,  L4, L5 and sacral ala levels were identified. Through anesthetized skin, a 20 gauge 10 mm RFL needle with a 10 mm active tip was advanced to the juncture of the superior articular process and the transverse process at the right L3 level where the right L2 medial branch resides. After the probe was instilled, motor testing took place up to 2 V without any paresthesia into the right leg. Then ablation took place at 80 C for 90 seconds. This was repeated for the right L4 , L5 and sacral ala levels corresponding to the right L3 and L4 medial branches and L5 Dorsal ramus.  The needles were removed and a band-aid was applied at each level.     The patient was transferred to the post-operative area in stable condition.

## 2019-08-06 NOTE — PROGRESS NOTES
Patient arrived in Post op phase 2 on cart. Report from Josefa Green RN. Site of injection without redness, drainage or bleeding. Patient denies numbness, tingling or weakness. Moves extremities x 4.

## 2019-08-06 NOTE — H&P
HISTORY AND PHYSICAL/PRE-SEDATION ASSESSMENT    Patient:  Arron Abdullahi   :  1965  Medical Record No.:  [id number]   Date:  2019  Physician:  Macey Payne M.D. Facility: [facility]    HISTORY OF PRESENT ILLNESS:                 The patient is a 53 y.o. female whom presents with lower back pain. Review of the imaging and physical exam of the patient confirmed the pre-procedure diagnosis. After a thorough discussion of risks, benefits and alternatives informed consent was obtained. Past Medical History:   Past Medical History:   Diagnosis Date    Hyperlipemia     Migraines       Past Surgical History:     Past Surgical History:   Procedure Laterality Date    KIDNEY DONATION Left     KNEE SURGERY  bilateral    ACL repair     NERVE SURGERY Right 1/15/2019    RIGHT LUMBAR TWO, LUMBAR THREE, LUMBAR FOUR, LUMBAR FIVE DORSAL RAMUS RADIOFREQUENCY ABLATION SITE CONFIRMED BY FLUOROSCOPY performed by Macey Payne MD at [place] [address]     Current Medications:   Prior to Admission medications    Medication Sig Start Date End Date Taking? Authorizing Provider   Erenumab-aooe (AIMOVIG 140 DOSE) 70 MG/ML SOAJ Inject 140 mg into the skin every 30 days   Yes Historical Provider, MD   rosuvastatin (CRESTOR) 5 MG tablet Take 5 mg by mouth daily   Yes Historical Provider, MD   montelukast (SINGULAIR) 10 MG tablet Take 10 mg by mouth daily  11/9/15  Yes Historical Provider, MD   meclizine (ANTIVERT) 25 MG tablet TAKE 1 TABLET BY MOUTH THREE TIMES DAILY 14  Yes Archie Carrel, DO   fluticasone [facility]) 50 MCG/ACT nasal spray  9/15/15   Historical Provider, MD   levocetirizine (XYZAL) 5 MG tablet Take 5 mg by mouth daily  11/9/15   Historical Provider, MD     Allergies:  Ibuprofen  Social History:    reports that she has never smoked. She has never used smokeless tobacco. She reports that she drinks alcohol. She reports that she does not use drugs.   Family History:   No family

## 2019-09-06 ENCOUNTER — OFFICE VISIT (OUTPATIENT)
Dept: ORTHOPEDIC SURGERY | Age: 54
End: 2019-09-06
Payer: COMMERCIAL

## 2019-09-06 VITALS
HEIGHT: 63 IN | WEIGHT: 130 LBS | RESPIRATION RATE: 16 BRPM | DIASTOLIC BLOOD PRESSURE: 72 MMHG | BODY MASS INDEX: 23.04 KG/M2 | HEART RATE: 86 BPM | SYSTOLIC BLOOD PRESSURE: 113 MMHG

## 2019-09-06 DIAGNOSIS — M47.816 SPONDYLOSIS OF LUMBAR REGION WITHOUT MYELOPATHY OR RADICULOPATHY: Primary | ICD-10-CM

## 2019-09-06 DIAGNOSIS — M51.36 DDD (DEGENERATIVE DISC DISEASE), LUMBAR: ICD-10-CM

## 2019-09-06 DIAGNOSIS — M48.061 LUMBAR FORAMINAL STENOSIS: ICD-10-CM

## 2019-09-06 PROCEDURE — 99213 OFFICE O/P EST LOW 20 MIN: CPT | Performed by: PHYSICIAN ASSISTANT

## 2019-09-06 PROCEDURE — G8420 CALC BMI NORM PARAMETERS: HCPCS | Performed by: PHYSICIAN ASSISTANT

## 2019-09-06 PROCEDURE — 1036F TOBACCO NON-USER: CPT | Performed by: PHYSICIAN ASSISTANT

## 2019-09-06 PROCEDURE — G8427 DOCREV CUR MEDS BY ELIG CLIN: HCPCS | Performed by: PHYSICIAN ASSISTANT

## 2019-09-06 PROCEDURE — 3017F COLORECTAL CA SCREEN DOC REV: CPT | Performed by: PHYSICIAN ASSISTANT

## 2020-01-27 ENCOUNTER — TELEPHONE (OUTPATIENT)
Dept: ORTHOPEDIC SURGERY | Age: 55
End: 2020-01-27

## 2020-01-27 NOTE — TELEPHONE ENCOUNTER
L/M FOR PATIENT that request for procedure was received online. However, patient has not been seen since 9/6/2019, therefore she would need to be seen in the office for follow up evaluation. Requested she contact the office to schedule an office visit.

## 2020-01-27 NOTE — TELEPHONE ENCOUNTER
Patient made an online request through patient pop for a RFA. Patient would like it scheduled on 2/6.  # [phone]

## 2020-02-06 ENCOUNTER — OFFICE VISIT (OUTPATIENT)
Dept: ORTHOPEDIC SURGERY | Age: 55
End: 2020-02-06
Payer: COMMERCIAL

## 2020-02-06 VITALS
HEIGHT: 63 IN | BODY MASS INDEX: 23.05 KG/M2 | WEIGHT: 130.07 LBS | DIASTOLIC BLOOD PRESSURE: 67 MMHG | SYSTOLIC BLOOD PRESSURE: 97 MMHG | HEART RATE: 84 BPM

## 2020-02-06 PROCEDURE — 1036F TOBACCO NON-USER: CPT | Performed by: PHYSICIAN ASSISTANT

## 2020-02-06 PROCEDURE — G8420 CALC BMI NORM PARAMETERS: HCPCS | Performed by: PHYSICIAN ASSISTANT

## 2020-02-06 PROCEDURE — G8484 FLU IMMUNIZE NO ADMIN: HCPCS | Performed by: PHYSICIAN ASSISTANT

## 2020-02-06 PROCEDURE — 99214 OFFICE O/P EST MOD 30 MIN: CPT | Performed by: PHYSICIAN ASSISTANT

## 2020-02-06 PROCEDURE — 3017F COLORECTAL CA SCREEN DOC REV: CPT | Performed by: PHYSICIAN ASSISTANT

## 2020-02-06 PROCEDURE — G8427 DOCREV CUR MEDS BY ELIG CLIN: HCPCS | Performed by: PHYSICIAN ASSISTANT

## 2020-02-06 NOTE — PROGRESS NOTES
Past Medical History:   Past Medical History:   Diagnosis Date    Hyperlipemia     Migraines       Past Surgical History:     Past Surgical History:   Procedure Laterality Date    KIDNEY DONATION Left     KNEE SURGERY  bilateral    ACL repair     NERVE SURGERY Right 1/15/2019    RIGHT LUMBAR TWO, LUMBAR THREE, LUMBAR FOUR, LUMBAR FIVE DORSAL RAMUS RADIOFREQUENCY ABLATION SITE CONFIRMED BY FLUOROSCOPY performed by Meme Raphael MD at [address] Right 8/6/2019    RIGHT LUMBAR TWO, LUMBAR THREE, LUMBAR FOUR, LUMBAR FIVE DORSAL RAMUS RADIOFREQUENCY ABLATION SITE CONFIRMED BY FLUOROSCOPY performed by Meme Raphael MD at [address] Right 2/18/2020    RIGHT LUMBAR TWO, LUMBAR THREE, LUMBAR FOUR, LUMBAR FIVE DORSAL RAMUS RADIOFREQUENCY ABLATION SITE CONFIRMED BY FLUOROSCOPY performed by Meme Raphael MD at [address]     Current Medications:     Current Outpatient Medications:     Erenumab-aooe (AIMOVIG 140 DOSE) 70 MG/ML SOAJ, Inject 140 mg into the skin every 30 days, Disp: , Rfl:     rosuvastatin (CRESTOR) 5 MG tablet, Take 5 mg by mouth daily, Disp: , Rfl:     fluticasone (FLONASE) 50 MCG/ACT nasal spray, , Disp: , Rfl: 11    levocetirizine (XYZAL) 5 MG tablet, Take 5 mg by mouth daily , Disp: , Rfl: 4    montelukast (SINGULAIR) 10 MG tablet, Take 10 mg by mouth daily , Disp: , Rfl: 11    meclizine (ANTIVERT) 25 MG tablet, TAKE 1 TABLET BY MOUTH THREE TIMES DAILY, Disp: 30 tablet, Rfl: 1  Allergies:  Ibuprofen  Social History:    reports that she has never smoked. She has never used smokeless tobacco. She reports current alcohol use. She reports that she does not use drugs.   Family History:   Family History   Problem Relation Age of Onset    Osteoarthritis Mother     Osteoarthritis Father        REVIEW OF SYSTEMS:   CONSTITUTIONAL: Denies unexplained weight loss, fevers, chills or fatigue  NEUROLOGICAL: Denies unsteady gait or progressive weakness  MUSCULOSKELETAL: Denies joint swelling or redness  GI: Denies nausea, vomiting, diarrhea   : Denies bowel or bladder issues       PHYSICAL EXAM:    Vitals: Blood pressure 97/67, pulse 84, height 5' 2.99\" (1.6 m), weight 130 lb 1.1 oz (59 kg), last menstrual period 08/04/2016, not currently breastfeeding. GENERAL EXAM:  · General Apparence: Patient is adequately groomed with no evidence of malnutrition. · Psychiatric: Orientation: The patient is oriented to time, place and person. The patient's mood and affect are appropriate   · Vascular: Examination reveals no swelling and palpation reveals no tenderness in upper or lower extremities. Good capillary refill. · The lymphatic examination of the neck, axillae and groin reveals all areas to be without enlargement or induration  · Sensation is intact without deficit in the upper and lower extremities to light touch and pinprick  · Coordination of the upper and lower extremities are normal.  · RIGHT UPPER EXTREMITY:  Inspection/examination of the right upper extremity does not show any tenderness, deformity or injury. Range of motion is unremarkable and pain-free. There is no gross instability. There are no rashes, ulcerations or lesions. Strength and tone are normal. No atrophy or abnormal movements are noted. · LEFT UPPER EXTREMITY: Inspection/examination of the left upper extremity does not show any tenderness, deformity or injury. Range of motion is unremarkable and pain-free. There is no gross instability. There are no rashes, ulcerations or lesions. Strength and tone are normal. No atrophy or abnormal movements are noted. LUMBAR/SACRAL EXAMINATION:  · Inspection: Local inspection shows no step-off or bruising. Lumbar alignment is normal. No instability is noted. · Palpation:   No evidence of tenderness at the midline. Lumbar paraspinal tenderness: Mild L4/5 and L5/S1 tenderness right side.  Bursal tenderness No tenderness bilaterally  There is no paraspinal spasm. · Range of Motion: limited by 50% in all planes due to pain specifically with extension and facet loading to the right. · Strength:   Strength testing is 5/5 in all muscle groups tested. · Special Tests:   Straight leg raise and crossed SLR negative. Gabriel's testing is negative bilaterally. FADIR's testing is negative bilaterally. Bowstring test negative. Slump test negative. · Skin: There are no rashes, ulcerations or lesions. · Reflexes: Reflexes are symmetrically 1+ at the patellar and ankle tendons. Clonus absent bilaterally at the feet. · Gait & station: normal, patient ambulates without assistance and no ataxia  · Additional Examinations:  · RIGHT LOWER EXTREMITY: Inspection/examination of the right lower extremity does not show any tenderness, deformity or injury. Range of motion is normal and pain-free. There is no gross instability. There are no rashes, ulcerations or lesions. Strength and tone are normal. No atrophy or abnormal movements are noted. · LEFT LOWER EXTREMITY:  Inspection/examination of the left lower extremity does not show any tenderness, deformity or injury. Range of motion is normal and pain-free. There is no gross instability. There are no rashes, ulcerations or lesions. Strength and tone are normal. No atrophy or abnormal movements are noted. Diagnostic Testing:    No new diagnostics  Results for orders placed or performed during the hospital encounter of 04/04/17   POC Pregnancy Urine Qual   Result Value Ref Range    Pregnancy, Urine Negative Detects HCG level >20 MIU/mL     Impression:       1. Spondylosis of lumbar region without myelopathy or radiculopathy    2. DDD (degenerative disc disease), lumbar    3. Lumbar foraminal stenosis        Plan:  Clinical Course: Above diagnoses are worsening    I discussed the diagnosis and the treatment options with Rocío Strauss today.      In Summary:  The various treatment options were outlined and discussed with Rojas Shine including:  Conservative care options: physical therapy, ice, medications, bracing, and activity modification. The indications for therapeutic injections. The indications for additional imaging/laboratory studies. The indications for (possible future) interventions. After considering the various options discussed, Rojas Shine elected to pursue a course of treatment that includes the followin. Medications:  No further recommendations for new medications. 2. PT:  Encouraged to continue with Home exercise program.    3. Further studies: No further studies. 4. Interventional:  We have discussed options such as radiofrequency ablation of the lumbar spine at the right L2, L3, L4, L5 DR level, risks include but limited to bleeding, infection, neuritis, increased pain, lack of pain relief, motor nerve injury. This does not include all possible risks. The patient verbalized understanding would like to proceed. Side effects and benefits were also discussed. The patient had a successful radiofrequency ablation from 2019 at 95% relief. 5. Follow up:  4-6 weeks      Rojas Shine was instructed to call the office if her symptoms worsen or if new symptoms appear prior to the next scheduled visit. She is specifically instructed to contact the office between now & her scheduled appointment if she has concerns related to her condition or if she needs assistance in scheduling the above tests. She is welcome to call for an appointment sooner if she has any additional concerns or questions. Luna ERICKSON ATC, am scribing for and in the presence of HEATH Chilel.  20 1:55 PM Luna Rose. The physical examination was performed between the patient and HEATH Chilel. All counseling during the appointment was performed between the patient and the provider. Haley Watson PA-C, personally performed the services described in

## 2020-02-06 NOTE — LETTER
Please schedule the following with:     Date:   20 @ 7:30     Account: [de-identified]  Patient: Flavio Simon    : 1965  Address:  [address] [address]    Phone (H):  [phone] (home) [phone] (work)     ----------------------------------------------------------------------------------------------  Diagnosis:     ICD-10-CM    1. Spondylosis of lumbar region without myelopathy or radiculopathy M47.816    2. DDD (degenerative disc disease), lumbar M51.36    3. Lumbar foraminal stenosis M48.061      Procedure: Radiofrequency Ablation Lumbar     Levels: L2, L3, L4, L5 DR  Side: Right   CPT Codes F121778, 50642    ----------------------------------------------------------------------------------------------  Injection # [id number]  [email or web address]    Attending Physician       Jacky Donovan. Lori Ramirez MD.  ----------------------------------------------------------------------------------------------  Injection Scheduled For:    At:    [facility] [address]    Pre-Cert#    2nd Insurance     Pre-Cert#    Comments:     · Infection control  ? Tested positive for MRSA in past 12 months:  no  ? Tested positive for MSSA \"staph infection\" in past 12 months: no  ? Tested positive for VRE (Vancomycin Resistant Enterococci) in past 12 months:   no  ? Currently on any antibiotics for an infection: no  · Anticoagulants:  ? On a blood thinner:  no   ?  Any history of bleeding disorder: no   · Advanced Liver disease: no   · Advanced Renal disease: no   · Glaucoma: no   · Diabetes: no      Sedation:         Yes  -----------------------------------------------------------------------------------------------  Allergies   Allergen Reactions    Ibuprofen      Only has one kindney

## 2020-02-17 ENCOUNTER — TELEPHONE (OUTPATIENT)
Dept: ORTHOPEDIC SURGERY | Age: 55
End: 2020-02-17

## 2020-02-17 NOTE — TELEPHONE ENCOUNTER
DOS   02/18/2020  CPT   47951  24189  05913.26  81398  DX   M47.816  M51.36  M48.061  OP SX AUTH  [id number]  VALID   02/18/2020 - 05/18/2020    RIGHT  LEVELS   L2  L3  L4  L5   PROCEDURE   NERUOTOMY  DR. Marylin Esquivel  [facility]  INSURANCE:   [facility]

## 2020-02-18 ENCOUNTER — HOSPITAL ENCOUNTER (OUTPATIENT)
Age: 55
Setting detail: OUTPATIENT SURGERY
Discharge: HOME | End: 2020-02-18
Attending: PHYSICAL MEDICINE & REHABILITATION | Admitting: PHYSICAL MEDICINE & REHABILITATION
Payer: COMMERCIAL

## 2020-02-18 VITALS
SYSTOLIC BLOOD PRESSURE: 119 MMHG | HEART RATE: 85 BPM | TEMPERATURE: 97.4 F | BODY MASS INDEX: 23.04 KG/M2 | OXYGEN SATURATION: 100 % | HEIGHT: 63 IN | WEIGHT: 130 LBS | DIASTOLIC BLOOD PRESSURE: 71 MMHG | RESPIRATION RATE: 19 BRPM

## 2020-02-18 PROCEDURE — 2500000003 HC RX 250 WO HCPCS: Performed by: PHYSICAL MEDICINE & REHABILITATION

## 2020-02-18 PROCEDURE — 6360000002 HC RX W HCPCS: Performed by: PHYSICAL MEDICINE & REHABILITATION

## 2020-02-18 PROCEDURE — 2580000003 HC RX 258: Performed by: PHYSICAL MEDICINE & REHABILITATION

## 2020-02-18 PROCEDURE — 7100000010 HC PHASE II RECOVERY - FIRST 15 MIN: Performed by: PHYSICAL MEDICINE & REHABILITATION

## 2020-02-18 PROCEDURE — 3600000012 HC SURGERY LEVEL 2 ADDTL 15MIN: Performed by: PHYSICAL MEDICINE & REHABILITATION

## 2020-02-18 PROCEDURE — 7100000011 HC PHASE II RECOVERY - ADDTL 15 MIN: Performed by: PHYSICAL MEDICINE & REHABILITATION

## 2020-02-18 PROCEDURE — 2709999900 HC NON-CHARGEABLE SUPPLY: Performed by: PHYSICAL MEDICINE & REHABILITATION

## 2020-02-18 PROCEDURE — 3600000002 HC SURGERY LEVEL 2 BASE: Performed by: PHYSICAL MEDICINE & REHABILITATION

## 2020-02-18 RX ORDER — LIDOCAINE HYDROCHLORIDE 10 MG/ML
INJECTION, SOLUTION INFILTRATION; PERINEURAL PRN
Status: DISCONTINUED | OUTPATIENT
Start: 2020-02-18 | End: 2020-02-18 | Stop reason: ALTCHOICE

## 2020-02-18 RX ORDER — SODIUM CHLORIDE, SODIUM LACTATE, POTASSIUM CHLORIDE, CALCIUM CHLORIDE 600; 310; 30; 20 MG/100ML; MG/100ML; MG/100ML; MG/100ML
INJECTION, SOLUTION INTRAVENOUS ONCE
Status: COMPLETED | OUTPATIENT
Start: 2020-02-18 | End: 2020-02-18

## 2020-02-18 RX ORDER — LIDOCAINE HYDROCHLORIDE 20 MG/ML
INJECTION, SOLUTION EPIDURAL; INFILTRATION; INTRACAUDAL; PERINEURAL PRN
Status: DISCONTINUED | OUTPATIENT
Start: 2020-02-18 | End: 2020-02-18 | Stop reason: ALTCHOICE

## 2020-02-18 RX ADMIN — SODIUM CHLORIDE, POTASSIUM CHLORIDE, SODIUM LACTATE AND CALCIUM CHLORIDE: 600; 310; 30; 20 INJECTION, SOLUTION INTRAVENOUS at 07:04

## 2020-02-18 RX ADMIN — LIDOCAINE HYDROCHLORIDE 0.1 ML: 10 INJECTION, SOLUTION EPIDURAL; INFILTRATION; INTRACAUDAL; PERINEURAL at 07:04

## 2020-02-18 ASSESSMENT — PAIN DESCRIPTION - DESCRIPTORS: DESCRIPTORS: BURNING

## 2020-02-18 ASSESSMENT — PAIN - FUNCTIONAL ASSESSMENT
PAIN_FUNCTIONAL_ASSESSMENT: PREVENTS OR INTERFERES SOME ACTIVE ACTIVITIES AND ADLS
PAIN_FUNCTIONAL_ASSESSMENT: 0-10

## 2020-02-18 NOTE — PROGRESS NOTES
Discharge instructions given to patient and family member. Verbalized understanding. No new pain, numbness, or weakness. Discharged in stable condition. Patient to car via wheelchair.

## 2020-02-18 NOTE — H&P
HISTORY AND PHYSICAL/PRE-SEDATION ASSESSMENT    Patient:  Marco A Henriquez   :  1965  Medical Record No.:  [id number]   Date:  2020  Physician:  Rebecca Veras M.D. Facility: [facility]    HISTORY OF PRESENT ILLNESS:                 The patient is a 53 y.o. female whom presents with lower back pain right sided. Review of the imaging and physical exam of the patient confirmed the pre-procedure diagnosis. After a thorough discussion of risks, benefits and alternatives informed consent was obtained. Past Medical History:   Past Medical History:   Diagnosis Date    Hyperlipemia     Migraines       Past Surgical History:     Past Surgical History:   Procedure Laterality Date    KIDNEY DONATION Left     KNEE SURGERY  bilateral    ACL repair     NERVE SURGERY Right 1/15/2019    RIGHT LUMBAR TWO, LUMBAR THREE, LUMBAR FOUR, LUMBAR FIVE DORSAL RAMUS RADIOFREQUENCY ABLATION SITE CONFIRMED BY FLUOROSCOPY performed by Rebecca Veras MD at [facility] Right 2019    RIGHT LUMBAR TWO, LUMBAR THREE, LUMBAR FOUR, LUMBAR FIVE DORSAL RAMUS RADIOFREQUENCY ABLATION SITE CONFIRMED BY FLUOROSCOPY performed by Rebecca Veras MD at [place] [address]     Current Medications:   Prior to Admission medications    Medication Sig Start Date End Date Taking?  Authorizing Provider   rosuvastatin (CRESTOR) 5 MG tablet Take 5 mg by mouth daily   Yes Historical Provider, MD   montelukast (SINGULAIR) 10 MG tablet Take 10 mg by mouth daily  11/9/15  Yes Historical Provider, MD   meclizine (ANTIVERT) 25 MG tablet TAKE 1 TABLET BY MOUTH THREE TIMES DAILY 14  Yes Gustavo Shall, DO   Erenumab-aooe (AIMOVIG 140 DOSE) 70 MG/ML SOAJ Inject 140 mg into the skin every 30 days    Historical Provider, MD   fluticasone [facility]) 50 MCG/ACT nasal spray  9/15/15   Historical Provider, MD   levocetirizine (XYZAL) 5 MG tablet Take 5 mg by mouth daily  11/9/15   Historical Provider, MD

## 2020-02-18 NOTE — OP NOTE
Patient:  Ekaterina Ramirez  YOB: 1965  Medical Record #:  [id number]   Place:   [address], [place]  Date:  2/18/2020   Physician:  Henok Bob MD, BIANKA    Procedure: Radiofrequency ablation of the Right L2, L3, L4 Medial branches and L5 Dorsal ramus    CPT 65240 [id number]     Pre-Procedure Diagnosis: Lumbar spondylosis without radiculopathy     Post-Procedure Diagnosis: Same     Sedation: Local with 1% Lidocaine 5 ml      EBL: None     Complications: None     Procedure Summary:     The patient was seen in the office for complaints of low back pain. Review of the imaging and physical exam of the patient confirmed the pre-procedure diagnosis. After a thorough discussion of risks, benefits and alternatives informed consent was obtained. The patient was brought to the procedure suite and placed in the prone position. The skin overlying the lumbar spine was prepped with chloraprep and draped in the usual sterile fashion. Using fluoroscopic guidance, the right L3, L4, L5 and sacral ala levels were identified. Through anesthetized skin, a 20 gauge 100 mm RFL needle with a 10 mm active tip was advanced to the juncture of the superior articular process and the transverse process at the right L3 level where the right L2 medial branch resides. After the probe was instilled, motor testing took place up to 2 V without any paresthesia into the right leg. Then ablation took place at 80 C for 90 seconds. This was repeated for the right L5 and sacral ala levels corresponding to the right L3 and L4 medial branches and L5 Dorsal ramus. Following the ablation, 80 mg of depomedrol mixed with 0.5% Marcaine was instilled in 1/6th aliquots at each level. The needles were removed and a band-aid was applied at each level. The patient was transferred to the post-operative area in stable condition.

## 2020-07-06 ENCOUNTER — TELEPHONE (OUTPATIENT)
Dept: ORTHOPEDIC SURGERY | Age: 55
End: 2020-07-06

## 2020-07-07 NOTE — TELEPHONE ENCOUNTER
S/W PATIENT to follow up from 2/18/2020 P RFA. She states she has been doing well but would like to schedule a follow up appointment. Patient has been scheduled based on preferences.

## 2020-07-24 ENCOUNTER — VIRTUAL VISIT (OUTPATIENT)
Dept: ORTHOPEDIC SURGERY | Age: 55
End: 2020-07-24
Payer: COMMERCIAL

## 2020-07-24 PROCEDURE — G8427 DOCREV CUR MEDS BY ELIG CLIN: HCPCS | Performed by: PHYSICAL MEDICINE & REHABILITATION

## 2020-07-24 PROCEDURE — 99213 OFFICE O/P EST LOW 20 MIN: CPT | Performed by: PHYSICAL MEDICINE & REHABILITATION

## 2020-07-24 PROCEDURE — 3017F COLORECTAL CA SCREEN DOC REV: CPT | Performed by: PHYSICAL MEDICINE & REHABILITATION

## 2020-07-24 NOTE — LETTER
Please schedule the following with:     Date:   20 @ 7:45   Account: [de-identified]  Patient: Gauri Smith    : 1965  Address:  [address] [address]    Phone (H):  [phone] (home) [phone] (work)     ----------------------------------------------------------------------------------------------  Diagnosis:     ICD-10-CM    1. DDD (degenerative disc disease), lumbar  M51.36    2. Lumbar foraminal stenosis  M48.061    3. Spondylosis of lumbar region without myelopathy or radiculopathy  M47.816          Levels:Right L2, L3, L4 and L5 DR lumbar medial branch RFA  Procedure type RADIOFREQUENCY ABLATION  Side RIGHT  CPT Codes 39178, 25535    ----------------------------------------------------------------------------------------------  Injection #    [id number]    Attending Physician       Lexx Stone.  Elizabeth Prather MD.  ----------------------------------------------------------------------------------------------  Injection Scheduled For:    At:    [address] [facility]    Pre-Cert#    2nd Insurance     Pre-Cert#    Comments or Special instructions:    · Infection control  · Tested positive for MRSA in past 12 months:  no  · Tested positive for MSSA \"staph infection\" in past 12 months: no  · Tested positive for VRE (Vancomycin Resistant Enterococci) in past 12 months:   no  · Currently on any antibiotics for an infection: no  · Anticoagulants:  · On a blood thinner:  no   · Any history of bleeding disorder: no   · Advanced Liver disease: no   · Advanced Renal disease: no   · Glaucoma: no   · Diabetes: no     Sedation:  Yes   -----------------------------------------------------------------------------------------------  Allergies   Allergen Reactions    Ibuprofen      Only has one kindney

## 2020-07-24 NOTE — PROGRESS NOTES
Follow up: [facility] visit (Audio/visual connection present)    Cortney Theodore  1965  [id number]         Chief Complaint   Patient presents with    Lower Back Pain     2/18/2020; RFA R L2, L3, L4 MB L5          HISTORY OF PRESENT ILLNESS:  Ms. Gt Armenta is a 63 y.o. female returns for a follow up visit for multiple medical problems. Her current presenting problems are   1. Spondylosis of lumbar region without myelopathy or radiculopathy    2. DDD (degenerative disc disease), lumbar    3. Lumbar foraminal stenosis    . As per information/history obtained from the PADT(patient assessment and documentation tool) - She complains of pain in the lower back with radiation to the hips Right and upper leg Right She rates the pain 7/10 and describes it as sharp, aching, throbbing, burning, stabbing. Pain is made worse by: movement, walking, sitting. She denies side effects from the current pain regimen. Patient reports that since the last follow up visit the physical functioning is better, family/social relationships are better, mood is better and sleep patterns are better, and that the overall functioning is better. Patient denies neurological bowel or bladder. Patient returns following a radiofrequency ablation to right L2, L3, L4 medial branches and L5 dorsal ramus on 2/18/2020. She reports experiencing 100% improvement of her symptoms for roughly 4 months and now 80% relief. The pain is still not as severe as it was previously but is progressively increasing. She continues to use ice and this does help intermittently. Her right hip pain does wake her up at night. There is some mild left sided low back and left hip discomfort. She did sustain a fall in April and saw a chiropractor who took radiographs. 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      Associated signs and symptoms:   Neurogenic bowel or bladder symptoms:  no   Perceived weakness:  no   Difficulty walking:  yes            Past medical, surgical, social and family history reviewed with the patient. No pertinent relevant history  Past Medical History:   Past Medical History:   Diagnosis Date    Hyperlipemia     Migraines       Past Surgical History:     Past Surgical History:   Procedure Laterality Date    KIDNEY DONATION Left     KNEE SURGERY  bilateral    ACL repair     NERVE SURGERY Right 1/15/2019    RIGHT LUMBAR TWO, LUMBAR THREE, LUMBAR FOUR, LUMBAR FIVE DORSAL RAMUS RADIOFREQUENCY ABLATION SITE CONFIRMED BY FLUOROSCOPY performed by Lainey Mosqueda MD at [address] Right 8/6/2019    RIGHT LUMBAR TWO, LUMBAR THREE, LUMBAR FOUR, LUMBAR FIVE DORSAL RAMUS RADIOFREQUENCY ABLATION SITE CONFIRMED BY FLUOROSCOPY performed by Lainey Mosqueda MD at [address] Right 2/18/2020    RIGHT LUMBAR TWO, LUMBAR THREE, LUMBAR FOUR, LUMBAR FIVE DORSAL RAMUS RADIOFREQUENCY ABLATION SITE CONFIRMED BY FLUOROSCOPY performed by Lainey [facility]queda MD at [facility]     Current Medications:     Current Outpatient Medications:     Erenumab-aooe (AIMOVIG 140 DOSE) 70 MG/ML SOAJ, Inject 140 mg into the skin every 30 days, Disp: , Rfl:     rosuvastatin (CRESTOR) 5 MG tablet, Take 5 mg by mouth daily, Disp: , Rfl:     fluticasone (FLONASE) 50 MCG/ACT nasal spray, , Disp: , Rfl: 11    levocetirizine (XYZAL) 5 MG tablet, Take 5 mg by mouth daily , Disp: , Rfl: 4    montelukast (SINGULAIR) 10 MG tablet, Take 10 mg by mouth daily , Disp: , Rfl: 11    meclizine (ANTIVERT) 25 MG tablet, TAKE 1 TABLET BY MOUTH THREE TIMES DAILY, Disp: 30 tablet, Rfl: 1  Allergies:  Ibuprofen  Social History:    reports that she has never smoked. She has never used smokeless tobacco. She reports current alcohol use. She reports that she does not use drugs.   Family History:   Family History   Problem Relation Age of Onset    Osteoarthritis Mother     Osteoarthritis Father        REVIEW OF SYSTEMS:   CONSTITUTIONAL: Denies unexplained weight loss, fevers, chills or fatigue  NEUROLOGICAL: Denies unsteady gait or progressive weakness  MUSCULOSKELETAL: Denies joint swelling or redness  GI: Denies nausea, vomiting, diarrhea   : Denies bowel or bladder issues       PHYSICAL EXAM:  Limitations present due to Telehealth  Vitals:   Patient-Reported Vitals 7/24/2020   Patient-Reported Weight 59KG   Patient-Reported Height 1.6M   Patient-Reported Pulse 72   Patient-Reported Temperature 98.2          GENERAL EXAM:  · General Apparence: Patient is adequately groomed with no evidence of malnutrition. · Psychiatric: Orientation: The patient is oriented to time, place and person. The patient's mood and affect are appropriate   · Vascular: Examination reveals no swelling and palpation reveals no tenderness in upper or lower extremities. Good capillary refill. · The lymphatic examination of the neck, axillae and groin reveals all areas to be without enlargement or induration   Sensation is intact without deficit in the upper and lower extremities to light touch   · Coordination of the upper and lower extremities are normal.  · Additional Examinations:  · RIGHT UPPER EXTREMITY:  Inspection/examination of the right upper extremity does not show any tenderness, deformity or injury. Range of motion is normal and pain-free. There is no gross instability. There are no rashes, ulcerations or lesions. Strength and tone are normal. No atrophy or abnormal movements are noted. · LEFT UPPER EXTREMITY: Inspection/examination of the left upper extremity does not show any tenderness, deformity or injury. Range of motion is normal and pain-free. There is no gross instability. There are no rashes, ulcerations or lesions. Strength and tone are normal. No atrophy or abnormal movements are noted. LUMBAR/SACRAL EXAMINATION:  · Inspection: Local inspection shows no step-off or bruising.   Lumbar alignment is normal.   · Palpation by patient:   No evidence of tenderness at the midline. Lumbar paraspinal tenderness Mild L4/5 and L5/S1 tenderness  Bursal tenderness No tenderness bilaterally  There is no paraspinal spasm. · Range of Motion: increased pain with facet loading bilaterally  · Strength:   Strength testing is at least 4/5 in all muscle groups tested based on visual exam  · Special Tests:   Straight leg raise and crossed SLR negative. · Skin: There are no rashes, ulcerations or lesions. · Gait & station: normal, patient ambulates without assistance and no ataxia  · Additional Examinations:  · RIGHT LOWER EXTREMITY: Inspection/examination of the right lower extremity does not show any tenderness, deformity or injury. Range of motion is unremarkable. There is no gross instability. There are no rashes, ulcerations or lesions. Strength and tone are normal. No atrophy or abnormal movements are noted. · LEFT LOWER EXTREMITY:  Inspection/examination of the left lower extremity does not show any tenderness, deformity or injury. Range of motion is unremarkable. There is no gross instability. There are no rashes, ulcerations or lesions. Strength and tone are normal. No atrophy or abnormal movements are noted. Diagnostic Testing:    No new diagnostics  Results for orders placed or performed during the hospital encounter of 04/04/17   POC Pregnancy Urine Qual   Result Value Ref Range    Pregnancy, Urine Negative Detects HCG level >20 MIU/mL     Impression:       1. Spondylosis of lumbar region without myelopathy or radiculopathy    2. DDD (degenerative disc disease), lumbar    3. Lumbar foraminal stenosis        Plan:  Clinical Course: Above diagnoses are worsening    I discussed the diagnosis and the treatment options with [facility] today. In Summary:  The various treatment options were outlined and discussed with [facility] including:  Conservative care options: physical therapy, ice, medications, bracing, and activity modification.  The indications for therapeutic injections. The indications for additional imaging/laboratory studies. The indications for (possible future) interventions. After considering the various options discussed, Rahul Murguia elected to pursue a course of treatment that includes the followin. Medications:  No further recommendations for new medications. 2. PT:  Encouraged to continue with Home exercise program.    3. Further studies: COVID-19 PCR testing prior to lumbar RFA    4. Interventional:  Will repeat lumbar RFA. Right L2, L3, L4 and L5 medial branch RFA  As such, I have confirmed the patient has met the general requirements including failure of conservative management including prescription strength analgesics, adjunctive medications were not an option due to comorbidities or advanced age, therapeutic exercise program, and no underlying addiction or behavioral disorders were identified to the ability of the provider. Symptoms are impacting their ADLs or iADLs such as walking and transferring and significant pain was noted in the HPI. Imaging studies as noted in the diagnostic imaging section of the consultation were reviewed and correlated with clinical findings. Fluoroscopy is utilized for interventional procedures. For repeat RFN  Repeat radiofrequency ablation will be performed at the same level as the first RFN lesion produced at least 50% pain relief and improvement in patient specific ADLs including walking and transferring for at least 6 months. 5. Follow up:  1-2 weeks      Rahul Murguia was instructed to call the office if her symptoms worsen or if new symptoms appear prior to the next scheduled visit. She is specifically instructed to contact the office between now & her scheduled appointment if she has concerns related to her condition or if she needs assistance in scheduling the above tests. She is welcome to call for an appointment sooner if she has any additional concerns or questions.      [facility] for in-person clinic visit. We have confirmed that, for purposes of billing, this is a virtual visit with for which we will submit a claim for reimbursement with your insurance company. The patient has accepted responsibility for any copays, coinsurance amounts or other amounts not covered by their insurance company. This visit was completed virtually using [email or web address].

## 2020-08-06 ENCOUNTER — OFFICE VISIT (OUTPATIENT)
Dept: PRIMARY CARE CLINIC | Age: 55
End: 2020-08-06

## 2020-08-06 NOTE — PROGRESS NOTES
Casilda Dandy received a viral test for COVID-19. They were educated on isolation and quarantine as appropriate. For any symptoms, they were directed to seek care from their PCP, given contact information to establish with a doctor, directed to an urgent care or the emergency room.

## 2020-08-06 NOTE — PATIENT INSTRUCTIONS
Advance Care Planning  People with COVID-19 may have no symptoms, mild symptoms, such as fever, cough, and shortness of breath or they may have more severe illness, developing severe and fatal pneumonia. As a result, Advance Care Planning with attention to naming a health care decision maker (someone you trust to make healthcare decisions for you if you could not speak for yourself) and sharing other health care preferences is important BEFORE a possible health crisis. Please contact your Primary Care Provider to discuss Advance Care Planning. Preventing the Spread of Coronavirus Disease 2019 in Homes and Residential Communities  For the most recent information go to [email or web address]    Prevention steps for People with confirmed or suspected COVID-19 (including persons under investigation) who do not need to be hospitalized  and   People with confirmed COVID-19 who were hospitalized and determined to be medically stable to go home    Your healthcare provider and public health staff will evaluate whether you can be cared for at home. If it is determined that you do not need to be hospitalized and can be isolated at home, you will be monitored by staff from your local or state health department. You should follow the prevention steps below until a healthcare provider or local or state health department says you can return to your normal activities. Stay home except to get medical care  People who are mildly ill with COVID-19 are able to isolate at home during their illness. You should restrict activities outside your home, except for getting medical care. Do not go to work, school, or public areas. Avoid using public transportation, ride-sharing, or taxis. Separate yourself from other people and animals in your home  People: As much as possible, you should stay in a specific room and away from other people in your home.  Also, you should use a separate bathroom, if available. Animals: You should restrict contact with pets and other animals while you are sick with COVID-19, just like you would around other people. Although there have not been reports of pets or other animals becoming sick with COVID-19, it is still recommended that people sick with COVID-19 limit contact with animals until more information is known about the virus. When possible, have another member of your household care for your animals while you are sick. If you are sick with COVID-19, avoid contact with your pet, including petting, snuggling, being kissed or licked, and sharing food. If you must care for your pet or be around animals while you are sick, wash your hands before and after you interact with pets and wear a facemask. Call ahead before visiting your doctor  If you have a medical appointment, call the healthcare provider and tell them that you have or may have COVID-19. This will help the healthcare providers office take steps to keep other people from getting infected or exposed. Wear a facemask  You should wear a facemask when you are around other people (e.g., sharing a room or vehicle) or pets and before you enter a healthcare providers office. If you are not able to wear a facemask (for example, because it causes trouble breathing), then people who live with you should not stay in the same room with you, or they should wear a facemask if they enter your room. Cover your coughs and sneezes  Cover your mouth and nose with a tissue when you cough or sneeze. Throw used tissues in a lined trash can. Immediately wash your hands with soap and water for at least 20 seconds or, if soap and water are not available, clean your hands with an alcohol-based hand  that contains at least 60% alcohol.   Clean your hands often  Wash your hands often with soap and water for at least 20 seconds, especially after blowing your nose, coughing, or sneezing; going to the bathroom; and have a medical emergency and need to call 911, notify the dispatch personnel that you have, or are being evaluated for COVID-19. If possible, put on a facemask before emergency medical services arrive. Discontinuing home isolation  Patients with confirmed COVID-19 should remain under home isolation precautions until the risk of secondary transmission to others is thought to be low. The decision to discontinue home isolation precautions should be made on a case-by-case basis, in consultation with healthcare providers and state and local health departments.

## 2020-08-07 LAB — SARS-COV-2, NAA: NOT DETECTED

## 2020-08-10 ENCOUNTER — TELEPHONE (OUTPATIENT)
Dept: ORTHOPEDIC SURGERY | Age: 55
End: 2020-08-10

## 2020-08-10 NOTE — TELEPHONE ENCOUNTER
Auth: # [id number]    Date: 08/11/2020 - 11/09/2020  Type of SX:  OP  [facility]  CPT: 66370  85811   DX Code: M51.36   M48.061   M47.816  SX area: OP  Insurance: [facility]  CPT  87569.49  [id number]   [facility] [place]

## 2020-08-11 ENCOUNTER — TELEPHONE (OUTPATIENT)
Dept: ORTHOPEDIC SURGERY | Age: 55
End: 2020-08-11

## 2020-08-11 ENCOUNTER — HOSPITAL ENCOUNTER (OUTPATIENT)
Age: 55
Setting detail: OUTPATIENT SURGERY
Discharge: HOME | End: 2020-08-11
Attending: PHYSICAL MEDICINE & REHABILITATION | Admitting: PHYSICAL MEDICINE & REHABILITATION
Payer: COMMERCIAL

## 2020-08-11 VITALS
OXYGEN SATURATION: 100 % | WEIGHT: 136 LBS | HEART RATE: 72 BPM | SYSTOLIC BLOOD PRESSURE: 119 MMHG | DIASTOLIC BLOOD PRESSURE: 74 MMHG | HEIGHT: 63 IN | TEMPERATURE: 98.2 F | RESPIRATION RATE: 16 BRPM | BODY MASS INDEX: 24.1 KG/M2

## 2020-08-11 PROCEDURE — 2709999900 HC NON-CHARGEABLE SUPPLY: Performed by: PHYSICAL MEDICINE & REHABILITATION

## 2020-08-11 PROCEDURE — 3600000002 HC SURGERY LEVEL 2 BASE: Performed by: PHYSICAL MEDICINE & REHABILITATION

## 2020-08-11 PROCEDURE — 2500000003 HC RX 250 WO HCPCS: Performed by: PHYSICAL MEDICINE & REHABILITATION

## 2020-08-11 PROCEDURE — 3600000012 HC SURGERY LEVEL 2 ADDTL 15MIN: Performed by: PHYSICAL MEDICINE & REHABILITATION

## 2020-08-11 PROCEDURE — 7100000010 HC PHASE II RECOVERY - FIRST 15 MIN: Performed by: PHYSICAL MEDICINE & REHABILITATION

## 2020-08-11 PROCEDURE — 6360000002 HC RX W HCPCS: Performed by: PHYSICAL MEDICINE & REHABILITATION

## 2020-08-11 PROCEDURE — 7100000011 HC PHASE II RECOVERY - ADDTL 15 MIN: Performed by: PHYSICAL MEDICINE & REHABILITATION

## 2020-08-11 PROCEDURE — 99152 MOD SED SAME PHYS/QHP 5/>YRS: CPT | Performed by: PHYSICAL MEDICINE & REHABILITATION

## 2020-08-11 PROCEDURE — 2580000003 HC RX 258: Performed by: PHYSICAL MEDICINE & REHABILITATION

## 2020-08-11 RX ORDER — MIDAZOLAM HYDROCHLORIDE 1 MG/ML
INJECTION INTRAMUSCULAR; INTRAVENOUS PRN
Status: DISCONTINUED | OUTPATIENT
Start: 2020-08-11 | End: 2020-08-11 | Stop reason: ALTCHOICE

## 2020-08-11 RX ORDER — MIDAZOLAM HYDROCHLORIDE 1 MG/ML
INJECTION INTRAMUSCULAR; INTRAVENOUS
Status: DISCONTINUED
Start: 2020-08-11 | End: 2020-08-11 | Stop reason: HOSPADM

## 2020-08-11 RX ORDER — LIDOCAINE HYDROCHLORIDE 10 MG/ML
INJECTION, SOLUTION INFILTRATION; PERINEURAL PRN
Status: DISCONTINUED | OUTPATIENT
Start: 2020-08-11 | End: 2020-08-11 | Stop reason: ALTCHOICE

## 2020-08-11 RX ORDER — LIDOCAINE HYDROCHLORIDE 20 MG/ML
INJECTION, SOLUTION EPIDURAL; INFILTRATION; INTRACAUDAL; PERINEURAL
Status: DISCONTINUED
Start: 2020-08-11 | End: 2020-08-11 | Stop reason: HOSPADM

## 2020-08-11 RX ORDER — LIDOCAINE HYDROCHLORIDE 20 MG/ML
INJECTION, SOLUTION EPIDURAL; INFILTRATION; INTRACAUDAL; PERINEURAL PRN
Status: DISCONTINUED | OUTPATIENT
Start: 2020-08-11 | End: 2020-08-11 | Stop reason: ALTCHOICE

## 2020-08-11 RX ORDER — BUPIVACAINE HYDROCHLORIDE 5 MG/ML
INJECTION, SOLUTION EPIDURAL; INTRACAUDAL
Status: DISCONTINUED
Start: 2020-08-11 | End: 2020-08-11 | Stop reason: HOSPADM

## 2020-08-11 RX ORDER — DEXAMETHASONE SODIUM PHOSPHATE 10 MG/ML
INJECTION, SOLUTION INTRAMUSCULAR; INTRAVENOUS
Status: DISCONTINUED
Start: 2020-08-11 | End: 2020-08-11 | Stop reason: HOSPADM

## 2020-08-11 RX ORDER — FENTANYL CITRATE 50 UG/ML
INJECTION, SOLUTION INTRAMUSCULAR; INTRAVENOUS PRN
Status: DISCONTINUED | OUTPATIENT
Start: 2020-08-11 | End: 2020-08-11 | Stop reason: ALTCHOICE

## 2020-08-11 RX ORDER — MELOXICAM 15 MG/1
15 TABLET ORAL DAILY
COMMUNITY

## 2020-08-11 RX ORDER — FENTANYL CITRATE 50 UG/ML
INJECTION, SOLUTION INTRAMUSCULAR; INTRAVENOUS
Status: DISCONTINUED
Start: 2020-08-11 | End: 2020-08-11 | Stop reason: HOSPADM

## 2020-08-11 RX ORDER — SODIUM CHLORIDE, SODIUM LACTATE, POTASSIUM CHLORIDE, CALCIUM CHLORIDE 600; 310; 30; 20 MG/100ML; MG/100ML; MG/100ML; MG/100ML
INJECTION, SOLUTION INTRAVENOUS CONTINUOUS
Status: CANCELLED | OUTPATIENT
Start: 2020-08-11

## 2020-08-11 RX ORDER — SODIUM CHLORIDE, SODIUM LACTATE, POTASSIUM CHLORIDE, CALCIUM CHLORIDE 600; 310; 30; 20 MG/100ML; MG/100ML; MG/100ML; MG/100ML
INJECTION, SOLUTION INTRAVENOUS CONTINUOUS
Status: DISCONTINUED | OUTPATIENT
Start: 2020-08-11 | End: 2020-08-11 | Stop reason: HOSPADM

## 2020-08-11 RX ADMIN — SODIUM CHLORIDE, POTASSIUM CHLORIDE, SODIUM LACTATE AND CALCIUM CHLORIDE: 600; 310; 30; 20 INJECTION, SOLUTION INTRAVENOUS at 07:30

## 2020-08-11 ASSESSMENT — PAIN DESCRIPTION - DESCRIPTORS: DESCRIPTORS: SHARP;BURNING

## 2020-08-11 ASSESSMENT — PAIN SCALES - GENERAL: PAINLEVEL_OUTOF10: 0

## 2020-08-11 NOTE — H&P
HISTORY AND PHYSICAL/PRE-SEDATION ASSESSMENT    Patient:  Geovanna Atkins   :  1965  Medical Record No.:  [id number]   Date:  2020  Physician:  Ceci Saldaña M.D. Facility: [facility]    HISTORY OF PRESENT ILLNESS:                 The patient is a 63 y.o. female whom presents with low back pain. Review of the imaging and physical exam of the patient confirmed the pre-procedure diagnosis. After a thorough discussion of risks, benefits and alternatives informed consent was obtained. Past Medical History:   Past Medical History:   Diagnosis Date    Hyperlipemia     Migraines       Past Surgical History:     Past Surgical History:   Procedure Laterality Date    KIDNEY DONATION Left     KNEE SURGERY  bilateral    ACL repair     NERVE SURGERY Right 1/15/2019    RIGHT LUMBAR TWO, LUMBAR THREE, LUMBAR FOUR, LUMBAR FIVE DORSAL RAMUS RADIOFREQUENCY ABLATION SITE CONFIRMED BY FLUOROSCOPY performed by Ceci Saldaña MD at [address] Right 2019    RIGHT LUMBAR TWO, LUMBAR THREE, LUMBAR FOUR, LUMBAR FIVE DORSAL RAMUS RADIOFREQUENCY ABLATION SITE CONFIRMED BY FLUOROSCOPY performed by Ceci Saldaña MD at [address] Right 2020    RIGHT LUMBAR TWO, LUMBAR THREE, LUMBAR FOUR, LUMBAR FIVE DORSAL RAMUS RADIOFREQUENCY ABLATION SITE CONFIRMED BY FLUOROSCOPY [facility]     Current Medications:   Prior to Admission medications    Medication Sig Start Date End Date Taking?  Authorizing Provider   meloxicam (MOBIC) 15 MG tablet Take 15 mg by mouth daily   Yes Historical Provider, MD   Erenumab-aooe (AIMOVIG 140 DOSE) 70 MG/ML SOAJ Inject 140 mg into the skin every 30 days   Yes Historical Provider, MD   rosuvastatin (CRESTOR) 5 MG tablet Take 5 mg by mouth daily   Yes Historical Provider, MD   levocetirizine (XYZAL) 5 MG tablet Take 5 mg by mouth daily  11/9/15  Yes Historical Provider, MD montelukast (SINGULAIR) 10 MG tablet Take 10 mg by mouth daily  11/9/15  Yes Historical Provider, MD   meclizine (ANTIVERT) 25 MG tablet TAKE 1 TABLET BY MOUTH THREE TIMES DAILY 4/17/14  Yes Conner De León, DO   fluticasone [facility]) 50 MCG/ACT nasal spray  9/15/15   Historical Provider, MD     Allergies:  Ibuprofen  Social History:    reports that she has never smoked. She has never used smokeless tobacco. She reports current alcohol use. She reports that she does not use drugs. Family History:   Family History   Problem Relation Age of Onset    Osteoarthritis Mother     Osteoarthritis Father        Vitals: Blood pressure 133/73, pulse 81, temperature 98.2 °F (36.8 °C), temperature source Temporal, resp. rate 12, height 5' 3\" (1.6 m), weight 136 lb (61.7 kg), last menstrual period 08/04/2016, SpO2 100 %, not currently breastfeeding. PHYSICAL EXAM:  HENT: Airway patent and reviewed  Cardiovascular: Normal rate, regular rhythm, normal heart sounds. Pulmonary/Chest: No wheezes. No rhonchi. No rales. Abdominal: Soft. Bowel sounds are normal. No distension. Extremities: Moves all extremities equally  Lumbar Spine: Painful range of motion, no midline tenderness       Diagnosis:Lumbar spondylosis without radiculopathy    Plan: Proceed with planned procedure    The patient was counseled at length about the risks of slade Covid-19 in the pj-operative and post-operative states including the recovery window of their procedure. The patient was made aware that slade Covid-19 after a surgical procedure may worsen their prognosis for recovering from the virus and lend to a higher morbidity and or mortality risk. The patient was given the options of postponing their procedure. All of the risks, benefits, and alternatives were discussed. The patient does wish to proceed with the procedure. ASA CLASS:         []   I.  Normal, healthy adult           [x]   II.  Mild systemic disease            [] III.  Severe systemic disease      Mallampati: Mallampati Class II - (soft palate, fauces & uvula are visible)      Sedation plan:   [x]  Local              []  Minimal                  []  General anesthesia    Patient's condition acceptable for planned procedure/sedation. Post Procedure Plan   Return to same level of care   ______________________     The risks and benefits as well as alternatives to the procedure have been discussed with the patient and or family. The patient and or next of kin understands and agrees to proceed.     Pepe Harrison M.D.

## 2020-08-11 NOTE — TELEPHONE ENCOUNTER
PATIENT WANTS TO LET DR. BUSTAMANTE KNOW THAT SHE  HAS BEEN ADDED TO [address] [place], [place] [address] [phone]

## 2020-08-11 NOTE — OP NOTE
Patient:  Chapin Tirado  YOB: 1965  Medical Record #:  [id number]   Place:   [address], [place]  Date:  8/11/2020   Physician:  Agnieszka Thompson MD, BIANKA    Procedure: Radiofrequency ablation of the Right L2, L3, L4 Medial branches and L5 Dorsal ramus    CPT 08206 [id number] x2     Pre-Procedure Diagnosis: Lumbar spondylosis without radiculopathy     Post-Procedure Diagnosis: Same     Sedation: Local with 1% Lidocaine 5 ml and 1 mg of IV Versed and 25 mcg of IV Fentanyl     EBL: None     Complications: None     Procedure Summary:     The patient was seen in the office for complaints of right sided low back pain. Review of the imaging and physical exam of the patient confirmed the pre-procedure diagnosis. After a thorough discussion of risks, benefits and alternatives informed consent was obtained. The patient was brought to the procedure suite and placed in the prone position. The skin overlying the lumbar spine was prepped with chloraprep and draped in the usual sterile fashion. Using fluoroscopic guidance, the right L3, L4, L5 and sacral ala levels were identified. Through anesthetized skin, a 20 gauge 100 mm RFL needle with a 10 mm active tip was advanced to the juncture of the superior articular process and the transverse process at the right L4 level where the right L3 medial branch resides. After the probe was instilled, motor testing took place up to 2 V without any paresthesia into the right leg. Then ablation took place at 80 C for 90 seconds. This was repeated for the right L3, L5 and sacral ala levels corresponding to the right L2, L4 medial branches and L5 Dorsal ramus. Following the ablation, 10 mg of depomedrol mixed with 0.5% Marcaine was instilled in 1/4th aliquots at each level. The needles were removed and a band-aid was applied at each level. The patient was transferred to the post-operative area in stable condition.

## 2020-08-14 ENCOUNTER — VIRTUAL VISIT (OUTPATIENT)
Dept: ORTHOPEDIC SURGERY | Age: 55
End: 2020-08-14
Payer: COMMERCIAL

## 2020-08-14 PROCEDURE — 99214 OFFICE O/P EST MOD 30 MIN: CPT | Performed by: PHYSICAL MEDICINE & REHABILITATION

## 2020-08-14 PROCEDURE — G8427 DOCREV CUR MEDS BY ELIG CLIN: HCPCS | Performed by: PHYSICAL MEDICINE & REHABILITATION

## 2020-08-14 PROCEDURE — 3017F COLORECTAL CA SCREEN DOC REV: CPT | Performed by: PHYSICAL MEDICINE & REHABILITATION

## 2020-08-14 NOTE — PROGRESS NOTES
Follow up: [facility] visit (Audio/visual connection present)    Lupe Nash  1965  [id number]         Chief Complaint   Patient presents with    Neck Pain     TR MRI CSP          HISTORY OF PRESENT ILLNESS:  Ms. Liliana Perez is a 63 y.o. female returns for a follow up visit for multiple medical problems. Her current presenting problems are   1. Cervical spondylosis without myelopathy    2. Foraminal stenosis of cervical region    3. DDD (degenerative disc disease), cervical    .    As per information/history obtained from the PADT(patient assessment and documentation tool) - She complains of pain in the neck with radiation to the shoulders Left She rates the pain 7/10 and describes it as sharp, burning. Pain is made worse by: using hands. She denies side effects from the current pain regimen. Patient reports that since the last follow up visit the physical functioning is unchanged, family/social relationships are unchanged, mood is unchanged and sleep patterns are unchanged, and that the overall functioning is unchanged. Patient denies neurological bowel or bladder. Ms Liliana Perez presents virtually today regarding an MRI of her cervical spine ordered by Dr. Barb Berman. She has not followed up with him regarding this. Her pain is described as constant in nature, but the intensity will fluctuate throughout the day. This pain initially started around April 25th when she fell. However, her primary pain at that time was in her low back. The symptoms do occasionally wake her up at night. Treatment entities thus far have included a therapist directed home exercise program, chiropractic care and ice. She reports minimal to no improvement of her symptoms. Ms Liliana Perez reports increase in headaches. However, she does take medication once a month for migraines. She denies any nausea or vomiting with these migraines. Patient denies blurred or double vision or tinnitus since the onset of pain.  Her last eye exam took place sometime in 2018. She does continue to wear reading glases, however she does not have other corrective lenses. The patient denies any weakness or inability to  or hold. Associated signs and symptoms:   Neurogenic bowel or bladder symptoms:  no   Perceived weakness:  no   Difficulty walking:  no            Past medical, surgical, social and family history reviewed with the patient.  No pertinent relevant history  Past Medical History:   Past Medical History:   Diagnosis Date    Hyperlipemia     Migraines       Past Surgical History:     Past Surgical History:   Procedure Laterality Date    KIDNEY DONATION Left     KNEE SURGERY  bilateral    ACL repair     NERVE SURGERY Right 1/15/2019    RIGHT LUMBAR TWO, LUMBAR THREE, LUMBAR FOUR, LUMBAR FIVE DORSAL RAMUS RADIOFREQUENCY ABLATION SITE CONFIRMED BY FLUOROSCOPY performed by Archie Carrillo MD at [address] Right 8/6/2019    RIGHT LUMBAR TWO, LUMBAR THREE, LUMBAR FOUR, LUMBAR FIVE DORSAL RAMUS RADIOFREQUENCY ABLATION SITE CONFIRMED BY FLUOROSCOPY performed by Archie Carrillo MD at [address] Right 2/18/2020    RIGHT LUMBAR TWO, LUMBAR THREE, LUMBAR FOUR, LUMBAR FIVE DORSAL RAMUS RADIOFREQUENCY ABLATION SITE CONFIRMED BY FLUOROSCOPY performed by Archie Carrillo MD at [address] Right 8/11/2020    RIGHT LUMBAR TWO, LUMBAR THREE, LUMBAR FOUR, AND LUMBAR FIVE MEDIAL BRANCH RADIOFREQUENCY ABLATION SITE CONFIRMED BY FLUOROSCOPY [facility]     Current Medications:     Current Outpatient Medications:     meloxicam (MOBIC) 15 MG tablet, Take 15 mg by mouth daily, Disp: , Rfl:     Erenumab-aooe (AIMOVIG 140 DOSE) 70 MG/ML SOAJ, Inject 140 mg into the skin every 30 days, Disp: , Rfl:     rosuvastatin (CRESTOR) 5 MG tablet, Take 5 mg by mouth daily, Disp: , Rfl:     fluticasone (FLONASE) 50 MCG/ACT nasal spray, , Disp: , Rfl: 11    levocetirizine (XYZAL) 5 MG tablet, Take 5 mg by mouth daily , Disp: , Rfl: 4    montelukast (SINGULAIR) 10 MG tablet, Take 10 mg by mouth daily , Disp: , Rfl: 11    meclizine (ANTIVERT) 25 MG tablet, TAKE 1 TABLET BY MOUTH THREE TIMES DAILY, Disp: 30 tablet, Rfl: 1  Allergies:  Ibuprofen  Social History:    reports that she has never smoked. She has never used smokeless tobacco. She reports current alcohol use. She reports that she does not use drugs. Family History:   Family History   Problem Relation Age of Onset    Osteoarthritis Mother     Osteoarthritis Father        REVIEW OF SYSTEMS:   CONSTITUTIONAL: Denies unexplained weight loss, fevers, chills or fatigue  NEUROLOGICAL: Denies unsteady gait or progressive weakness  MUSCULOSKELETAL: Denies joint swelling or redness  GI: Denies nausea, vomiting, diarrhea   : Denies bowel or bladder issues       PHYSICAL EXAM:  Limitations present due to Telehealth  Vitals:   Patient-Reported Vitals 8/14/2020   Patient-Reported Weight 61.7KG   Patient-Reported Height 1.6M   Patient-Reported Pulse 68   Patient-Reported Temperature 98.6        GENERAL EXAM:  · General Apparence: Patient is adequately groomed with no evidence of malnutrition. · Psychiatric: Orientation: The patient is oriented to time, place and person. The patient's mood and affect are appropriate   · Vascular: Examination reveals no swelling and palpation reveals no tenderness in upper or lower extremities. Good capillary refill. · The lymphatic examination of the neck, axillae and groin reveals all areas to be without enlargement or induration   Sensation is intact without deficit in the upper and lower extremities to light touch   · Coordination of the upper and lower extremities are normal.    CERVICAL EXAMINATION:  · Inspection: Local inspection shows no step-off or bruising. Cervical alignment is normal.   · Palpation by patient: No evidence of tenderness at the midline. Paraspinal tenderness is present. There is no paraspinal spasm. · Range of Motion:  increased pain with facet loading to the left   · Strength: Strength testing is at least 4/5 bilateral upper extremities based on visual exam  · Special Tests:   Spurling's and Becker's are negative bilaterally. Irene and Impingement tests are negative bilaterally. · Skin:There are no rashes, ulcerations or lesions. · Gait & station:  normal, patient ambulates without assistance and no ataxia  · Additional Examinations:  · RIGHT UPPER EXTREMITY:  Inspection/examination of the right upper extremity does not show any tenderness, deformity or injury. Range of motion is normal and pain-free. There is no gross instability. There are no rashes, ulcerations or lesions. Strength and tone are normal. No atrophy or abnormal movements are noted. · LEFT UPPER EXTREMITY: Inspection/examination of the left upper extremity does not show any tenderness, deformity or injury. Range of motion is normal and pain-free. There is no gross instability. There are no rashes, ulcerations or lesions. Strength and tone are normal. No atrophy or abnormal movements are noted. · Additional Examinations:  · RIGHT LOWER EXTREMITY: Inspection/examination of the right lower extremity does not show any tenderness, deformity or injury. Range of motion is unremarkable. There is no gross instability. There are no rashes, ulcerations or lesions. Strength and tone are normal. No atrophy or abnormal movements are noted. · LEFT LOWER EXTREMITY:  Inspection/examination of the left lower extremity does not show any tenderness, deformity or injury. Range of motion is unremarkable. There is no gross instability. There are no rashes, ulcerations or lesions. Strength and tone are normal. No atrophy or abnormal movements are noted.         Diagnostic Testing:    MR cervical spine shows C4/5, C5/6 spondylosis and left C5 foraminal stenosis  Results for orders placed or performed in visit on 08/06/20 COVID-19   Result Value Ref Range    SARS-CoV-2, LAYO NOT DETECTED NOT DETECTED     Impression:       1. Cervical spondylosis without myelopathy    2. Foraminal stenosis of cervical region    3. DDD (degenerative disc disease), cervical        Plan:  Clinical Course: New diagnoses    I discussed the diagnosis and the treatment options with Varinder Mendosa today. In Summary:  The various treatment options were outlined and discussed with Varinder Mendosa including:  Conservative care options: physical therapy, ice, medications, bracing, and activity modification. The indications for therapeutic injections. The indications for additional imaging/laboratory studies. The indications for (possible future) interventions. After considering the various options discussed, Varinder Mendosa elected to pursue a course of treatment that includes the followin. Medications:  No further recommendations for new medications. 2. PT:  Encouraged to continue with Home exercise program.    3. Further studies: COVID-19 PCR testing      4. Interventional:We Have discussed risks benefits alternatives of cervical medial branch blocks the left C3-C4-C5 and C6 levels. Based on physical exam and radiologic findings she looks to be an excellent candidate for medial branch blocks. This would be diagnostic. If symptoms improve she may be an excellent candidate for radiofrequency ablation. 5. Follow up:  4-6 weeks      Varinder Mendosa was instructed to call the office if her symptoms worsen or if new symptoms appear prior to the next scheduled visit. She is specifically instructed to contact the office between now & her scheduled appointment if she has concerns related to her condition or if she needs assistance in scheduling the above tests. She is welcome to call for an appointment sooner if she has any additional concerns or questions. Dean Puentes ATC, am scribing for Dr. Barbara Guillen.    20 4:11 PM Yajaira Guzman ATC     The physical examination was performed between the patient and Dr. Swati Jorge. All counseling during the appointment was performed between the patient and provider. I, Dr. Harriet Choudhury. Angela, personally performed the services described in this documentation as scribed by ZANE Clifford and it is both accurate and complete. Mary Saez. David Wu MD, BIANKA, [facility]  Board Certified in [address] [place] [address] Certified and Fellowship Trained in [facility])             This dictation was performed with a verbal recognition program [facility]) and it was checked for errors. It is possible that there are still dictated errors within this office note. If so, please bring any errors to my attention for an addendum. All efforts were made to ensure that this office note is accurate. Patient has verbally accepted the following: We confirm that, for purposes of billing, this is a virtual visit with the provider for which we will submit a claim for reimbursement with the insurance company. The patient accepts responsibility for any co-pays, coinsurance amounts or other amounts not covered by the insurance company. Patient location: [address] [place] [address]   Physician location: [facility] office  Time spent: not billed by time  Present on the call: myself and patient    Pursuant to the emergency declaration under the [address] [place], [address] waiver authority and the [employer] Act, this Virtual  Visit was conducted, with patient's consent, to reduce the patient's risk of exposure to COVID-19 and provide continuity of care for an established patient. Services were provided through a video synchronous discussion virtually to substitute for in-person clinic visit.     We have confirmed that, for purposes of billing, this is a virtual visit with for which we will submit a claim for reimbursement with your insurance company. The patient has accepted responsibility for any copays, coinsurance amounts or other amounts not covered by their insurance company. This visit was completed virtually using doxy. me.

## 2020-08-14 NOTE — LETTER
Please schedule the following with:     Date:   20 @ 8:15   Account: [de-identified]  Patient: Shikha Smith    : 1965  Address:  [address] [address]    Phone (H):  [phone] (home) [phone] (work)     ----------------------------------------------------------------------------------------------  Diagnosis:     ICD-10-CM    1. Cervical spondylosis without myelopathy  M47.812    2. Foraminal stenosis of cervical region  M48.02    3. DDD (degenerative disc disease), cervical  M50.30          Levels:Left C3, C4, C5, C6 medial branch blocks #1  CPT Codes 50918, [id number]    ----------------------------------------------------------------------------------------------  Injection # 1   [email or web address]    Attending Physician       La Nena Roman.  Ana Pierson MD.      ----------------------------------------------------------------------------------------------  Injection Scheduled For:    At:    [address] [facility]    Pre-Cert#    2nd Insurance     Pre-Cert#    Comments or Special instructions:    · Infection control  · Tested positive for MRSA in past 12 months:  no  · Tested positive for MSSA \"staph infection\" in past 12 months: no  · Tested positive for VRE (Vancomycin Resistant Enterococci) in past 12 months:   no  · Currently on any antibiotics for an infection: no  · Anticoagulants:  · On a blood thinner:  no   · Any history of bleeding disorder: no   · Advanced Liver disease: no   · Advanced Renal disease: no   · Glaucoma: no   · Diabetes: no     Sedation:  Yes  -----------------------------------------------------------------------------------------------  Allergies   Allergen Reactions    Ibuprofen      Only has one kindney

## 2020-08-18 ENCOUNTER — TELEPHONE (OUTPATIENT)
Dept: ORTHOPEDIC SURGERY | Age: 55
End: 2020-08-18

## 2020-08-18 NOTE — TELEPHONE ENCOUNTER
Auth: # [id number]    Date: 9/01/2020 thru 11/30/2020  Type of SX:  Outpatient MBB  Location: [address] [place] [address]  CPT: 58437, 90186   DX Code: F23.079, M48.02, M50.30  SX area: Cervical Spine  Insurance: [facility]      CPT: [id number] [id number], [id number]  [facility]

## 2020-08-25 ENCOUNTER — OFFICE VISIT (OUTPATIENT)
Dept: PRIMARY CARE CLINIC | Age: 55
End: 2020-08-25

## 2020-08-25 NOTE — PATIENT INSTRUCTIONS

## 2020-08-25 NOTE — PROGRESS NOTES
Taylor Fletcher received a viral test for COVID-19. They were educated on isolation and quarantine as appropriate. For any symptoms, they were directed to seek care from their PCP, given contact information to establish with a doctor, directed to an urgent care or the emergency room.

## 2020-08-26 LAB — SARS-COV-2, NAA: NOT DETECTED

## 2020-09-01 ENCOUNTER — HOSPITAL ENCOUNTER (OUTPATIENT)
Age: 55
Setting detail: OUTPATIENT SURGERY
Discharge: HOME | End: 2020-09-01
Attending: PHYSICAL MEDICINE & REHABILITATION | Admitting: PHYSICAL MEDICINE & REHABILITATION
Payer: COMMERCIAL

## 2020-09-01 VITALS
TEMPERATURE: 97.2 F | BODY MASS INDEX: 23.92 KG/M2 | DIASTOLIC BLOOD PRESSURE: 71 MMHG | WEIGHT: 135 LBS | HEIGHT: 63 IN | SYSTOLIC BLOOD PRESSURE: 128 MMHG | HEART RATE: 68 BPM | OXYGEN SATURATION: 100 % | RESPIRATION RATE: 16 BRPM

## 2020-09-01 PROCEDURE — 7100000010 HC PHASE II RECOVERY - FIRST 15 MIN: Performed by: PHYSICAL MEDICINE & REHABILITATION

## 2020-09-01 PROCEDURE — 6360000004 HC RX CONTRAST MEDICATION: Performed by: PHYSICAL MEDICINE & REHABILITATION

## 2020-09-01 PROCEDURE — 2580000003 HC RX 258: Performed by: PHYSICAL MEDICINE & REHABILITATION

## 2020-09-01 PROCEDURE — 2709999900 HC NON-CHARGEABLE SUPPLY: Performed by: PHYSICAL MEDICINE & REHABILITATION

## 2020-09-01 PROCEDURE — 2500000003 HC RX 250 WO HCPCS: Performed by: PHYSICAL MEDICINE & REHABILITATION

## 2020-09-01 PROCEDURE — 3600000002 HC SURGERY LEVEL 2 BASE: Performed by: PHYSICAL MEDICINE & REHABILITATION

## 2020-09-01 RX ORDER — BUPIVACAINE HYDROCHLORIDE 2.5 MG/ML
INJECTION, SOLUTION INFILTRATION; PERINEURAL PRN
Status: DISCONTINUED | OUTPATIENT
Start: 2020-09-01 | End: 2020-09-01 | Stop reason: ALTCHOICE

## 2020-09-01 RX ORDER — SODIUM CHLORIDE, SODIUM LACTATE, POTASSIUM CHLORIDE, CALCIUM CHLORIDE 600; 310; 30; 20 MG/100ML; MG/100ML; MG/100ML; MG/100ML
INJECTION, SOLUTION INTRAVENOUS CONTINUOUS
Status: DISCONTINUED | OUTPATIENT
Start: 2020-09-01 | End: 2020-09-01 | Stop reason: HOSPADM

## 2020-09-01 RX ORDER — LIDOCAINE HYDROCHLORIDE 10 MG/ML
INJECTION, SOLUTION EPIDURAL; INFILTRATION; INTRACAUDAL; PERINEURAL PRN
Status: DISCONTINUED | OUTPATIENT
Start: 2020-09-01 | End: 2020-09-01 | Stop reason: ALTCHOICE

## 2020-09-01 RX ORDER — MIDAZOLAM HYDROCHLORIDE 1 MG/ML
INJECTION INTRAMUSCULAR; INTRAVENOUS
Status: DISCONTINUED
Start: 2020-09-01 | End: 2020-09-01 | Stop reason: WASHOUT

## 2020-09-01 RX ORDER — FENTANYL CITRATE 50 UG/ML
INJECTION, SOLUTION INTRAMUSCULAR; INTRAVENOUS
Status: DISCONTINUED
Start: 2020-09-01 | End: 2020-09-01 | Stop reason: WASHOUT

## 2020-09-01 RX ORDER — BUPIVACAINE HYDROCHLORIDE 2.5 MG/ML
INJECTION, SOLUTION EPIDURAL; INFILTRATION; INTRACAUDAL
Status: DISCONTINUED
Start: 2020-09-01 | End: 2020-09-01 | Stop reason: HOSPADM

## 2020-09-01 RX ADMIN — SODIUM CHLORIDE, POTASSIUM CHLORIDE, SODIUM LACTATE AND CALCIUM CHLORIDE: 600; 310; 30; 20 INJECTION, SOLUTION INTRAVENOUS at 07:30

## 2020-09-01 ASSESSMENT — PAIN DESCRIPTION - DESCRIPTORS: DESCRIPTORS: BURNING

## 2020-09-01 NOTE — H&P
HISTORY AND PHYSICAL/PRE-SEDATION ASSESSMENT    Patient:  Sisi Zambrano   :  1965  Medical Record No.:  [id number]   Date:  2020  Physician:  Kamlesh Renteria M.D. Facility: [facility]    HISTORY OF PRESENT ILLNESS:                 The patient is a 63 y.o. female whom presents with neck pain. Review of the imaging and physical exam of the patient confirmed the pre-procedure diagnosis. After a thorough discussion of risks, benefits and alternatives informed consent was obtained. Past Medical History:   Past Medical History:   Diagnosis Date    Hyperlipemia     Migraines       Past Surgical History:     Past Surgical History:   Procedure Laterality Date    KIDNEY DONATION Left     KNEE SURGERY  bilateral    ACL repair     NERVE SURGERY Right 1/15/2019    RIGHT LUMBAR TWO, LUMBAR THREE, LUMBAR FOUR, LUMBAR FIVE DORSAL RAMUS RADIOFREQUENCY ABLATION SITE CONFIRMED BY FLUOROSCOPY performed by Kamlesh Renteria MD at [address] [place] [address] Right 2019    RIGHT LUMBAR TWO, LUMBAR THREE, LUMBAR FOUR, LUMBAR FIVE DORSAL RAMUS RADIOFREQUENCY ABLATION SITE CONFIRMED BY FLUOROSCOPY performed by Kamlesh Renteria MD at [address] [place] [address] Right 2020    RIGHT LUMBAR TWO, LUMBAR THREE, LUMBAR FOUR, LUMBAR FIVE DORSAL RAMUS RADIOFREQUENCY ABLATION SITE CONFIRMED BY FLUOROSCOPY performed by Kamlesh Renteria MD at [address] [place] [address] Right 2020    RIGHT LUMBAR TWO, LUMBAR THREE, LUMBAR FOUR, AND LUMBAR FIVE MEDIAL BRANCH RADIOFREQUENCY ABLATION SITE CONFIRMED BY FLUOROSCOPY performed by Kamlesh Renteria MD at [place] [address]     Current Medications:   Prior to Admission medications    Medication Sig Start Date End Date Taking?  Authorizing Provider   meloxicam (MOBIC) 15 MG tablet Take 15 mg by mouth daily   Yes Historical Provider, MD Mackumab-aooe (AIMOVIG 140 DOSE) 70 MG/ML SOAJ Inject 140 mg into the skin every 30 days   Yes Historical Provider, MD   rosuvastatin (CRESTOR) 5 MG tablet Take 5 mg by mouth daily   Yes Historical Provider, MD   levocetirizine (XYZAL) 5 MG tablet Take 5 mg by mouth daily  11/9/15  Yes Historical Provider, MD   meclizine (ANTIVERT) 25 MG tablet TAKE 1 TABLET BY MOUTH THREE TIMES DAILY 4/17/14  Yes Amaya Safe, DO   fluticasone [facility]) 50 MCG/ACT nasal spray  9/15/15   Historical Provider, MD   montelukast (SINGULAIR) 10 MG tablet Take 10 mg by mouth daily  11/9/15   Historical Provider, MD     Allergies:  Ibuprofen  Social History:    reports that she has never smoked. She has never used smokeless tobacco. She reports current alcohol use. She reports that she does not use drugs. Family History:   Family History   Problem Relation Age of Onset    Osteoarthritis Mother     Osteoarthritis Father        Vitals: Blood pressure 106/71, pulse 70, temperature 97.2 °F (36.2 °C), temperature source Temporal, resp. rate 16, height 5' 3\" (1.6 m), weight 135 lb (61.2 kg), last menstrual period 08/04/2016, SpO2 100 %, not currently breastfeeding. PHYSICAL EXAM:  HENT: Airway patent and reviewed  Cardiovascular: Normal rate, regular rhythm, normal heart sounds. Pulmonary/Chest: No wheezes. No rhonchi. No rales. Abdominal: Soft. Bowel sounds are normal. No distension. Extremities: Moves all extremities equally  Lumbar Spine: Painful range of motion, no midline tenderness       Diagnosis:Cervical spondylosis without radiculopathy    Plan: Proceed with planned procedure    The patient was counseled at length about the risks of slade Covid-19 in the pj-operative and post-operative states including the recovery window of their procedure. The patient was made aware that slade Covid-19 after a surgical procedure may worsen their prognosis for recovering from the virus and lend to a higher morbidity and or mortality risk. The patient was given the options of postponing their procedure.  All of the risks, benefits, and alternatives were discussed. The patient does wish to proceed with the procedure. ASA CLASS:         []   I. Normal, healthy adult           [x]   II.  Mild systemic disease            []   III. Severe systemic disease      Mallampati: Mallampati Class II - (soft palate, fauces & uvula are visible)      Sedation plan:   [x]  Local              []  Minimal                  []  General anesthesia    Patient's condition acceptable for planned procedure/sedation. Post Procedure Plan   Return to same level of care   ______________________     The risks and benefits as well as alternatives to the procedure have been discussed with the patient and or family. The patient and or next of kin understands and agrees to proceed.     Cristina Davsi M.D.

## 2020-09-01 NOTE — OP NOTE
Patient:  Giovanni Barrios  YOB: 1965  Medical Record #:  [id number]   Place:  [address] [place], [place]  Date:  9/1/2020   Physician:  Fan Dumont MD, BIANKA    Procedure: Left C3, C4, C5, C6 Cervical Medial Branch Blocks    MBB #1     Pre-Procedure Diagnosis: Cervical spondylosis without radiculopathy    Post-Procedure Diagnosis: Same    Sedation: Local with 1% Lidocaine 3 ml and no IV sedation    EBL: None    Complications: None    Procedure Summary:    The patient was seen in the office for complaints of neck pain. Review of the imaging and physical exam of the patient confirmed the pre-procedure diagnosis. After a thorough discussion of risks, benefits and alternatives informed consent was obtained. The patient was brought to the procedure suite and placed in the prone position. The skin overlying the cervical spine was prepped and draped in the usual sterile fashion. Using fluoroscopic guidance, the left C3, C4, C5 and C6 levels were identified. Through anesthetized skin a 22 gauge 3.5 inch curved tip spinal needle was advanced to the waist of the articular pillar at the left C3. Isovue M300 was instilled showing a nerve root outline pattern, without evidence of vascular spread. A total of 0.5 ml of 0.25% Marcaine was instilled at left C3 medial branch. This was repeated for the left C4, C5, C6 medial branches. The needles were removed and a band-aid applied. The patient was transferred to the post-operative area in stable condition.

## 2020-09-01 NOTE — PROGRESS NOTES
Patient is able to demonstrated the ability to move from a reclining position to an upright position within the recliner. hand held assist

## 2020-09-09 ENCOUNTER — TELEPHONE (OUTPATIENT)
Dept: ORTHOPEDIC SURGERY | Age: 55
End: 2020-09-09

## 2020-09-09 NOTE — TELEPHONE ENCOUNTER
S/W PATIENT who is requesting an MRI of her LSP, states she discussed this with AAS during a VV. Advised that I do not see documentation as of yet, but will request guidance from provider. Patient advised that if MRI is approved by provider, she will be contacted once the MRI has been approved by insurance; if provider declines MRI, she will be contacted accordingly. Patient wishes to go to [facility] if approved. Patient voiced understanding of the conversation and will contact the office with further questions or concerns. Update: provider has advised to go ahead with MRI LSP order. Order has been placed and sent to pre-cert.

## 2020-09-11 ENCOUNTER — VIRTUAL VISIT (OUTPATIENT)
Dept: ORTHOPEDIC SURGERY | Age: 55
End: 2020-09-11
Payer: COMMERCIAL

## 2020-09-11 PROCEDURE — 1036F TOBACCO NON-USER: CPT | Performed by: PHYSICAL MEDICINE & REHABILITATION

## 2020-09-11 PROCEDURE — G8420 CALC BMI NORM PARAMETERS: HCPCS | Performed by: PHYSICAL MEDICINE & REHABILITATION

## 2020-09-11 PROCEDURE — 3017F COLORECTAL CA SCREEN DOC REV: CPT | Performed by: PHYSICAL MEDICINE & REHABILITATION

## 2020-09-11 PROCEDURE — 99214 OFFICE O/P EST MOD 30 MIN: CPT | Performed by: PHYSICAL MEDICINE & REHABILITATION

## 2020-09-11 PROCEDURE — G8427 DOCREV CUR MEDS BY ELIG CLIN: HCPCS | Performed by: PHYSICAL MEDICINE & REHABILITATION

## 2020-09-11 NOTE — PROGRESS NOTES
S/W PATIENT regarding MRI LSP approval and authorization being valid until 10/25/2020. Patient was instructed to call Erlin Holt to schedule MRI LSP, then contact our office for follow up appointment. MRI LSP results will not be given over the phone or via [employer]. Patient was also asked to allow a minimum 48 hours for follow up appointment from MRI scan in order for staff to obtain MRI report. Patient currently has a follow up appointment schedule for 10/2020. Advised to contact the office if needing to reschedule this to accommodate MRI scan. Patient voiced understanding of MRI results not being given over the phone.

## 2020-09-11 NOTE — PROGRESS NOTES
Follow up: [facility] visit (Audio/visual connection present)    Carrie Crowley  1965  [id number]         Chief Complaint   Patient presents with    Lower Back Pain     8/11: R L2, L3, L4 MB L5 DR Field Neck Pain     9/1: CMBB #1 L C3, C4, C5, C6         HISTORY OF PRESENT ILLNESS:  Ms. Reggie Porter is a 63 y.o. female returns for a follow up visit for multiple medical problems. Her current presenting problems are No diagnosis found. .    As per information/history obtained from the PADT(patient assessment and documentation tool) - She complains of pain in the neck with radiation to the shoulders Left and mid back She rates the pain 6/10 and describes it as sharp, aching. Pain is made worse by: movement. She denies side effects from the current pain regimen. Patient reports that since the last follow up visit the physical functioning is unchanged, family/social relationships are unchanged, mood is unchanged and sleep patterns are unchanged, and that the overall functioning is unchanged. Patient denies neurological bowel or bladder. She reports 100% relief after cervical MBB. Her pain is now 6/10. She underwent lumbar RFA, which gave 90% relief of her pain. No radiating pain into the lower extremities. She has pain into the right gluteal region 2/10 and the left side to the upper gluteal reason. Sitting aggravates her pain. Moving improves her pain. Associated signs and symptoms:   Neurogenic bowel or bladder symptoms:  no   Perceived weakness:  no   Difficulty walking:  no            Past medical, surgical, social and family history reviewed with the patient.  No pertinent relevant history  Past Medical History:   Past Medical History:   Diagnosis Date    Hyperlipemia     Migraines       Past Surgical History:     Past Surgical History:   Procedure Laterality Date    KIDNEY DONATION Left     KNEE SURGERY  bilateral    ACL repair     NERVE SURGERY Right 1/15/2019    RIGHT LUMBAR TWO, LUMBAR THREE, LUMBAR FOUR, LUMBAR FIVE DORSAL RAMUS RADIOFREQUENCY ABLATION SITE CONFIRMED BY FLUOROSCOPY performed by Immanuel Garcia MD at [address] Right 8/6/2019    RIGHT LUMBAR TWO, LUMBAR THREE, LUMBAR FOUR, LUMBAR FIVE DORSAL RAMUS RADIOFREQUENCY ABLATION SITE CONFIRMED BY FLUOROSCOPY performed by Immanuel Garcia MD at [address] Right 2/18/2020    RIGHT LUMBAR TWO, LUMBAR THREE, LUMBAR FOUR, LUMBAR FIVE DORSAL RAMUS RADIOFREQUENCY ABLATION SITE CONFIRMED BY FLUOROSCOPY performed by Immanuel Garcia MD at [address] Right 8/11/2020    RIGHT LUMBAR TWO, LUMBAR THREE, LUMBAR FOUR, AND LUMBAR FIVE MEDIAL BRANCH RADIOFREQUENCY ABLATION SITE CONFIRMED BY FLUOROSCOPY performed by Immanuel Garcia MD at [address] Left 9/1/2020    LEFT CERVICAL THREE, CERVICAL FOUR, CERVICAL FIVE, CERVICAL SIX MEDIAL BRANCH BLOCK SITE CONFIRMED BY FLUOROSCOPY performed by Immanuel Garcia MD at [facility]     Current Medications:     Current Outpatient Medications:     meloxicam (MOBIC) 15 MG tablet, Take 15 mg by mouth daily, Disp: , Rfl:     Erenumab-aooe (AIMOVIG 140 DOSE) 70 MG/ML SOAJ, Inject 140 mg into the skin every 30 days, Disp: , Rfl:     rosuvastatin (CRESTOR) 5 MG tablet, Take 5 mg by mouth daily, Disp: , Rfl:     fluticasone (FLONASE) 50 MCG/ACT nasal spray, , Disp: , Rfl: 11    levocetirizine (XYZAL) 5 MG tablet, Take 5 mg by mouth daily , Disp: , Rfl: 4    montelukast (SINGULAIR) 10 MG tablet, Take 10 mg by mouth daily , Disp: , Rfl: 11    meclizine (ANTIVERT) 25 MG tablet, TAKE 1 TABLET BY MOUTH THREE TIMES DAILY, Disp: 30 tablet, Rfl: 1  Allergies:  Ibuprofen  Social History:    reports that she has never smoked. She has never used smokeless tobacco. She reports current alcohol use. She reports that she does not use drugs.   Family History:   Family History   Problem Relation Age of Onset    Osteoarthritis Mother     Osteoarthritis Father        REVIEW OF SYSTEMS:   CONSTITUTIONAL: Denies unexplained weight loss, fevers, chills or fatigue  NEUROLOGICAL: Denies unsteady gait or progressive weakness  MUSCULOSKELETAL: Denies joint swelling or redness  GI: Denies nausea, vomiting, diarrhea   : Denies bowel or bladder issues       PHYSICAL EXAM:  Limitations present due to Telehealth  Vitals: . VITALSPTREPORTED     GENERAL EXAM:  · General Apparence: Patient is adequately groomed with no evidence of malnutrition. · Psychiatric: Orientation: The patient is oriented to time, place and person. The patient's mood and affect are appropriate   · Vascular: Examination reveals no swelling and palpation reveals no tenderness in upper or lower extremities. Good capillary refill. · The lymphatic examination of the neck, axillae and groin reveals all areas to be without enlargement or induration   Sensation is intact without deficit in the upper and lower extremities to light touch   · Coordination of the upper and lower extremities are normal.    CERVICAL EXAMINATION:  · Inspection: Local inspection shows no step-off or bruising. Cervical alignment is normal.   · Palpation by patient: No evidence of tenderness at the midline. Paraspinal tenderness is not present. There is no paraspinal spasm. · Range of Motion:  increased pain with facet loading to the left   · Strength: Strength testing is at least 4/5 bilateral upper extremities based on visual exam  · Special Tests:   Spurling's and Becker's are negative bilaterally. · Skin:There are no rashes, ulcerations or lesions. · Gait & station:  normal, patient ambulates without assistance and no ataxia  · Additional Examinations:  · RIGHT UPPER EXTREMITY:  Inspection/examination of the right upper extremity does not show any tenderness, deformity or injury. Range of motion is normal and pain-free. There is no gross instability.   There are no rashes, ulcerations or lesions. Strength and tone are normal. No atrophy or abnormal movements are noted. · LEFT UPPER EXTREMITY: Inspection/examination of the left upper extremity does not show any tenderness, deformity or injury. Range of motion is normal and pain-free. There is no gross instability. There are no rashes, ulcerations or lesions. Strength and tone are normal. No atrophy or abnormal movements are noted. · Additional Examinations:  · RIGHT LOWER EXTREMITY: Inspection/examination of the right lower extremity does not show any tenderness, deformity or injury. Range of motion is unremarkable. There is no gross instability. There are no rashes, ulcerations or lesions. Strength and tone are normal. No atrophy or abnormal movements are noted. · LEFT LOWER EXTREMITY:  Inspection/examination of the left lower extremity does not show any tenderness, deformity or injury. Range of motion is unremarkable. There is no gross instability. There are no rashes, ulcerations or lesions. Strength and tone are normal. No atrophy or abnormal movements are noted. Diagnostic Testing:    MR Lumbar spine pending  Results for orders placed or performed in visit on 08/25/20   COVID-19   Result Value Ref Range    SARS-CoV-2, LAYO NOT DETECTED NOT DETECTED     Impression:       1. Cervical spondylosis without myelopathy    2. Spondylosis of lumbar region without myelopathy or radiculopathy    3. Lumbar foraminal stenosis    4. DDD (degenerative disc disease), lumbar        Plan:  Clinical Course: Above diagnoses are worsening    I discussed the diagnosis and the treatment options with Milon Force today. In Summary:  The various treatment options were outlined and discussed with Pepe Yeh including:  Conservative care options: physical therapy, ice, medications, bracing, and activity modification. The indications for therapeutic injections. The indications for additional imaging/laboratory studies.   The indications for (possible future) interventions. After considering the various options discussed, Jazmin Nesbitt elected to pursue a course of treatment that includes the followin. Medications:  No further recommendations for new medications. 2. PT:  Encouraged to continue with Home exercise program.    3. Further studies: MR Lumbar spine to evaluate for new onset left leg pain      4. Interventional:  85% relief after lumbar RFA. 100% relief after left CMBB   Risks benefits alternatives were discussed for left cervical medial branch blocks at the C3-4-5 6 levels. She understands the second set of diagnostic blocks. She had 100% relief following the first set of diagnostic blocks. If she is good relief from the blocks she can be set up for an ablation at the left C3-4-5 6 levels for the medial branches. 5. Follow up:  4-6 weeks      Jazmin Nesbitt was instructed to call the office if her symptoms worsen or if new symptoms appear prior to the next scheduled visit. She is specifically instructed to contact the office between now & her scheduled appointment if she has concerns related to her condition or if she needs assistance in scheduling the above tests. She is welcome to call for an appointment sooner if she has any additional concerns or questions. Isac Steward. Marcus Mcgee MD, BIANKA, [facility]  Board Certified in [address] [place] [address] Certified and Fellowship Trained in [facility] ([facility])             This dictation was performed with a verbal recognition program [facility]) and it was checked for errors. It is possible that there are still dictated errors within this office note. If so, please bring any errors to my attention for an addendum. All efforts were made to ensure that this office note is accurate. Patient has verbally accepted the following:      We confirm that, for purposes of billing, this is a virtual visit with the provider for which we will submit a claim for reimbursement with the insurance company. The patient accepts responsibility for any co-pays, coinsurance amounts or other amounts not covered by the insurance company. Patient location: Home  Physician location: [facility] office  Time spent: not billed by time  Present on the call: myself and patient    Pursuant to the emergency declaration under the [facility] waiver authority and the [employer] Act, this Virtual  Visit was conducted, with patient's consent, to reduce the patient's risk of exposure to COVID-19 and provide continuity of care for an established patient. Services were provided through a video synchronous discussion virtually to substitute for in-person clinic visit. We have confirmed that, for purposes of billing, this is a virtual visit with for which we will submit a claim for reimbursement with your insurance company. The patient has accepted responsibility for any copays, coinsurance amounts or other amounts not covered by their insurance company. This visit was completed virtually using doxy. me.

## 2020-09-14 ENCOUNTER — TELEPHONE (OUTPATIENT)
Dept: ORTHOPEDIC SURGERY | Age: 55
End: 2020-09-14

## 2020-09-14 NOTE — TELEPHONE ENCOUNTER
Auth: # [id number]    Date: 9/29/2020 thru 12/28/2020  Type of SX:  Outpatient MBB #2  Location: [address] [place] [address]  CPT: 79902, 06383   DX Code: V02.268, M47.816, M48.061, M51.36  SX area: Cervical spine  Insurance: [facility]    CPT: 54244 [id number], [id number]

## 2020-09-22 ENCOUNTER — TELEPHONE (OUTPATIENT)
Dept: ORTHOPEDIC SURGERY | Age: 55
End: 2020-09-22

## 2020-09-22 NOTE — TELEPHONE ENCOUNTER
THE MRI THAT WAS REQUESTED IS COMPLETED AT [facility] AND CAN BE VIEWED AT ANYTIME.

## 2020-09-22 NOTE — TELEPHONE ENCOUNTER
MRI LSP report was received from [address] [place] [address] and in patient's chart on 9/14/2020. The results were received by the provider and will be reviewed at the patient's next visit.

## 2020-09-29 ENCOUNTER — HOSPITAL ENCOUNTER (OUTPATIENT)
Age: 55
Setting detail: OUTPATIENT SURGERY
Discharge: HOME | End: 2020-09-29
Attending: PHYSICAL MEDICINE & REHABILITATION | Admitting: PHYSICAL MEDICINE & REHABILITATION
Payer: COMMERCIAL

## 2020-09-29 VITALS
SYSTOLIC BLOOD PRESSURE: 116 MMHG | HEIGHT: 63 IN | WEIGHT: 135 LBS | OXYGEN SATURATION: 99 % | HEART RATE: 76 BPM | TEMPERATURE: 97 F | RESPIRATION RATE: 16 BRPM | DIASTOLIC BLOOD PRESSURE: 65 MMHG | BODY MASS INDEX: 23.92 KG/M2

## 2020-09-29 PROCEDURE — 6360000004 HC RX CONTRAST MEDICATION: Performed by: PHYSICAL MEDICINE & REHABILITATION

## 2020-09-29 PROCEDURE — 2580000003 HC RX 258: Performed by: PHYSICAL MEDICINE & REHABILITATION

## 2020-09-29 PROCEDURE — 2500000003 HC RX 250 WO HCPCS: Performed by: PHYSICAL MEDICINE & REHABILITATION

## 2020-09-29 PROCEDURE — 3600000012 HC SURGERY LEVEL 2 ADDTL 15MIN: Performed by: PHYSICAL MEDICINE & REHABILITATION

## 2020-09-29 PROCEDURE — 7100000010 HC PHASE II RECOVERY - FIRST 15 MIN: Performed by: PHYSICAL MEDICINE & REHABILITATION

## 2020-09-29 PROCEDURE — 2709999900 HC NON-CHARGEABLE SUPPLY: Performed by: PHYSICAL MEDICINE & REHABILITATION

## 2020-09-29 PROCEDURE — 3600000002 HC SURGERY LEVEL 2 BASE: Performed by: PHYSICAL MEDICINE & REHABILITATION

## 2020-09-29 RX ORDER — BUPIVACAINE HYDROCHLORIDE 2.5 MG/ML
INJECTION, SOLUTION INFILTRATION; PERINEURAL PRN
Status: DISCONTINUED | OUTPATIENT
Start: 2020-09-29 | End: 2020-09-29 | Stop reason: ALTCHOICE

## 2020-09-29 RX ORDER — LIDOCAINE HYDROCHLORIDE 10 MG/ML
INJECTION, SOLUTION EPIDURAL; INFILTRATION; INTRACAUDAL; PERINEURAL PRN
Status: DISCONTINUED | OUTPATIENT
Start: 2020-09-29 | End: 2020-09-29 | Stop reason: ALTCHOICE

## 2020-09-29 RX ORDER — SODIUM CHLORIDE, SODIUM LACTATE, POTASSIUM CHLORIDE, CALCIUM CHLORIDE 600; 310; 30; 20 MG/100ML; MG/100ML; MG/100ML; MG/100ML
INJECTION, SOLUTION INTRAVENOUS CONTINUOUS
Status: DISCONTINUED | OUTPATIENT
Start: 2020-09-29 | End: 2020-09-29 | Stop reason: HOSPADM

## 2020-09-29 RX ORDER — BUPIVACAINE HYDROCHLORIDE 2.5 MG/ML
INJECTION, SOLUTION EPIDURAL; INFILTRATION; INTRACAUDAL
Status: DISCONTINUED
Start: 2020-09-29 | End: 2020-09-29 | Stop reason: HOSPADM

## 2020-09-29 RX ADMIN — SODIUM CHLORIDE, POTASSIUM CHLORIDE, SODIUM LACTATE AND CALCIUM CHLORIDE: 600; 310; 30; 20 INJECTION, SOLUTION INTRAVENOUS at 07:52

## 2020-09-29 ASSESSMENT — PAIN DESCRIPTION - DESCRIPTORS: DESCRIPTORS: BURNING

## 2020-09-29 NOTE — H&P
HISTORY AND PHYSICAL/PRE-SEDATION ASSESSMENT    Patient:  Jesica Urbano   :  1965  Medical Record No.:  [id number]   Date:  2020  Physician:  Madeleine Villaseñor M.D. Facility: [facility]    HISTORY OF PRESENT ILLNESS:                 The patient is a 63 y.o. female whom presents with neck pain. Review of the imaging and physical exam of the patient confirmed the pre-procedure diagnosis. After a thorough discussion of risks, benefits and alternatives informed consent was obtained. Past Medical History:   Past Medical History:   Diagnosis Date    Hyperlipemia     Migraines       Past Surgical History:     Past Surgical History:   Procedure Laterality Date    KIDNEY DONATION Left     KNEE SURGERY  bilateral    ACL repair     NERVE SURGERY Right 1/15/2019    RIGHT LUMBAR TWO, LUMBAR THREE, LUMBAR FOUR, LUMBAR FIVE DORSAL RAMUS RADIOFREQUENCY ABLATION SITE CONFIRMED BY FLUOROSCOPY performed by Madeleine Villaseñor MD at [address] [facility] Right 2019    RIGHT LUMBAR TWO, LUMBAR THREE, LUMBAR FOUR, LUMBAR FIVE DORSAL RAMUS RADIOFREQUENCY ABLATION SITE CONFIRMED BY FLUOROSCOPY performed by Madeleine Villaseñor MD at [address] [facility] Right 2020    RIGHT LUMBAR TWO, LUMBAR THREE, LUMBAR FOUR, LUMBAR FIVE DORSAL RAMUS RADIOFREQUENCY ABLATION SITE CONFIRMED BY FLUOROSCOPY performed by Madeleine Villaseñor MD at [address] [facility] Right 2020    RIGHT LUMBAR TWO, LUMBAR THREE, LUMBAR FOUR, AND LUMBAR FIVE MEDIAL BRANCH RADIOFREQUENCY ABLATION SITE CONFIRMED BY FLUOROSCOPY performed by Madeleine Villaseñor MD at [address] [facility] Left 2020    LEFT CERVICAL THREE, CERVICAL FOUR, CERVICAL FIVE, CERVICAL SIX MEDIAL BRANCH BLOCK SITE CONFIRMED BY FLUOROSCOPY performed by Madeleine Villaseñor MD at [address]     Current Medications:   Prior to Admission medications    Medication Sig Start Date End Date Taking? Authorizing Provider   Erenumab-aooe (AIMOVIG 140 DOSE) 70 MG/ML SOAJ Inject 140 mg into the skin every 30 days   Yes Historical Provider, MD   rosuvastatin (CRESTOR) 5 MG tablet Take 5 mg by mouth daily   Yes Historical Provider, MD   levocetirizine (XYZAL) 5 MG tablet Take 5 mg by mouth daily  11/9/15  Yes Historical Provider, MD   montelukast (SINGULAIR) 10 MG tablet Take 10 mg by mouth daily  11/9/15  Yes Historical Provider, MD   meclizine (ANTIVERT) 25 MG tablet TAKE 1 TABLET BY MOUTH THREE TIMES DAILY 4/17/14  Yes [place] Tomas,    meloxicam (MOBIC) 15 MG tablet Take 15 mg by mouth daily    Historical Provider, MD   fluticasone Tyra Heather) 50 MCG/ACT nasal spray  9/15/15   Historical Provider, MD     Allergies:  Ibuprofen  Social History:    reports that she has never smoked. She has never used smokeless tobacco. She reports current alcohol use. She reports that she does not use drugs. Family History:   Family History   Problem Relation Age of Onset    Osteoarthritis Mother     Osteoarthritis Father        Vitals: Blood pressure 105/69, pulse 79, temperature 97 °F (36.1 °C), temperature source Temporal, resp. rate 16, height 5' 3\" (1.6 m), weight 135 lb (61.2 kg), last menstrual period 08/04/2016, SpO2 98 %, not currently breastfeeding. PHYSICAL EXAM:  HENT: Airway patent and reviewed  Cardiovascular: Normal rate, regular rhythm, normal heart sounds. Pulmonary/Chest: No wheezes. No rhonchi. No rales. Abdominal: Soft. Bowel sounds are normal. No distension. Extremities: Moves all extremities equally  Lumbar Spine: Painful range of motion, no midline tenderness       Diagnosis:Cervical spondylosis without radiculopathy    Plan: Proceed with planned procedure    The patient was counseled at length about the risks of slade Covid-19 in the pj-operative and post-operative states including the recovery window of their procedure.   The patient was made aware that slade Covid-19 after a surgical procedure may worsen their prognosis for recovering from the virus and lend to a higher morbidity and or mortality risk. The patient was given the options of postponing their procedure. All of the risks, benefits, and alternatives were discussed. The patient does wish to proceed with the procedure. ASA CLASS:         []   I. Normal, healthy adult           [x]   II.  Mild systemic disease            []   III. Severe systemic disease      Mallampati: Mallampati Class II - (soft palate, fauces & uvula are visible)      Sedation plan:   [x]  Local              []  Minimal                  []  General anesthesia    Patient's condition acceptable for planned procedure/sedation. Post Procedure Plan   Return to same level of care   ______________________     The risks and benefits as well as alternatives to the procedure have been discussed with the patient and or family. The patient and or next of kin understands and agrees to proceed.     Britni Parrish M.D.

## 2020-09-29 NOTE — OP NOTE
Patient:  Alessandra Muniz  YOB: 1965  Medical Record #:  [id number]   Place: [address], [place]  Date:  9/29/2020   Physician:  Liane Donovan MD, BIANKA    Procedure: Left C3, C4, C5, C6 Cervical Medial Branch Blocks     MBB #2      Pre-Procedure Diagnosis: Cervical spondylosis without radiculopathy     Post-Procedure Diagnosis: Same     Sedation: Local with 1% Lidocaine 3 ml and no IV sedation     EBL: None     Complications: None     Procedure Summary:     The patient was seen in the office for complaints of neck pain. Review of the imaging and physical exam of the patient confirmed the pre-procedure diagnosis. After a thorough discussion of risks, benefits and alternatives informed consent was obtained.     The patient was brought to the procedure suite and placed in the prone position. The skin overlying the cervical spine was prepped and draped in the usual sterile fashion. Using fluoroscopic guidance, the left C3, C4, C5 and C6 levels were identified. Through anesthetized skin a 22 gauge 3.5 inch curved tip spinal needle was advanced to the waist of the articular pillar at the left C3. Isovue M300 was instilled showing a nerve root outline pattern, without evidence of vascular spread. A total of 0.5 ml of 0.25% Marcaine was instilled at left C3 medial branch. This was repeated for the left C4, C5, C6 medial branches. The needles were removed and a band-aid applied.     The patient was transferred to the post-operative area in stable condition.

## 2020-10-02 ENCOUNTER — VIRTUAL VISIT (OUTPATIENT)
Dept: ORTHOPEDIC SURGERY | Age: 55
End: 2020-10-02
Payer: COMMERCIAL

## 2020-10-02 PROCEDURE — 99213 OFFICE O/P EST LOW 20 MIN: CPT | Performed by: PHYSICAL MEDICINE & REHABILITATION

## 2020-10-02 PROCEDURE — G8420 CALC BMI NORM PARAMETERS: HCPCS | Performed by: PHYSICAL MEDICINE & REHABILITATION

## 2020-10-02 PROCEDURE — 3017F COLORECTAL CA SCREEN DOC REV: CPT | Performed by: PHYSICAL MEDICINE & REHABILITATION

## 2020-10-02 PROCEDURE — G8427 DOCREV CUR MEDS BY ELIG CLIN: HCPCS | Performed by: PHYSICAL MEDICINE & REHABILITATION

## 2020-10-02 PROCEDURE — 1036F TOBACCO NON-USER: CPT | Performed by: PHYSICAL MEDICINE & REHABILITATION

## 2020-10-02 PROCEDURE — G8484 FLU IMMUNIZE NO ADMIN: HCPCS | Performed by: PHYSICAL MEDICINE & REHABILITATION

## 2020-10-02 NOTE — PROGRESS NOTES
Follow up: 2000 [facility] visit (Audio/visual connection present)    Amina Leon  1965  [id number]         Chief Complaint   Patient presents with    Neck Pain     9/29: CMBB #2 L C3, C4, C5, C6 % improvement     Lower Back Pain     TR MRI LSP          HISTORY OF PRESENT ILLNESS:  Ms. Keren García is a 63 y.o. female returns for a follow up visit for multiple medical problems. Her current presenting problems are   1. Cervical spondylosis without myelopathy    2. Lumbar foraminal stenosis    3. DDD (degenerative disc disease), cervical    4. Foraminal stenosis of cervical region    . As per information/history obtained from the PADT(patient assessment and documentation tool) - She complains of pain in the neck with radiation to the shoulders Left She rates the pain 7/10 and describes it as aching. Pain is made worse by: movement. She denies side effects from the current pain regimen. Patient reports that since the last follow up visit the physical functioning is better, family/social relationships are better, mood is better and sleep patterns are better, and that the overall functioning is better. Patient denies neurological bowel or bladder. She presents after undergoing her second set of cervical medial branch blocks of the left C3-4-5 6 levels. She is had 100% improvement for 10 hours following the nerve blocks. She also presents today for follow-up of an MRI of her lumbar spine. She reports she has pain radiating into the right leg past her knee and into the left hip. Associated signs and symptoms:   Neurogenic bowel or bladder symptoms:  no   Perceived weakness:  no   Difficulty walking:  no            Past medical, surgical, social and family history reviewed with the patient.  No pertinent relevant history  Past Medical History:   Past Medical History:   Diagnosis Date    Hyperlipemia     Migraines       Past Surgical History:     Past Surgical History:   Procedure Laterality Date  KIDNEY DONATION Left     KNEE SURGERY  bilateral    ACL repair     NERVE SURGERY Right 1/15/2019    RIGHT LUMBAR TWO, LUMBAR THREE, LUMBAR FOUR, LUMBAR FIVE DORSAL RAMUS RADIOFREQUENCY ABLATION SITE CONFIRMED BY FLUOROSCOPY performed by Cassandra Mccurdy MD at [address] [facility] Right 8/6/2019    RIGHT LUMBAR TWO, LUMBAR THREE, LUMBAR FOUR, LUMBAR FIVE DORSAL RAMUS RADIOFREQUENCY ABLATION SITE CONFIRMED BY FLUOROSCOPY performed by Cassandra Mccurdy MD at [address] [facility] Right 2/18/2020    RIGHT LUMBAR TWO, LUMBAR THREE, LUMBAR FOUR, LUMBAR FIVE DORSAL RAMUS RADIOFREQUENCY ABLATION SITE CONFIRMED BY FLUOROSCOPY performed by Cassandra Mccurdy MD at [address] [facility] Right 8/11/2020    RIGHT LUMBAR TWO, LUMBAR THREE, LUMBAR FOUR, AND LUMBAR FIVE MEDIAL BRANCH RADIOFREQUENCY ABLATION SITE CONFIRMED BY FLUOROSCOPY performed by Cassandra Mccurdy MD at [address] [facility] Left 9/1/2020    LEFT CERVICAL THREE, CERVICAL FOUR, CERVICAL FIVE, CERVICAL SIX MEDIAL BRANCH BLOCK SITE CONFIRMED BY FLUOROSCOPY performed by Cassandra Mccurdy MD at [address] [place] [address] 9/29/2020    LEFT CERVICAL THREE CERVICAL FOUR CERVICAL FIVE CERVICAL SIX MEDIAL BRANCH BLOCKS SITE CONFIRMED BY FLUOROSCOPY performed by Cassandra Mccurdy MD at [facility]     Current Medications:     Current Outpatient Medications:     Erenumab-aooe (AIMOVIG 140 DOSE) 70 MG/ML SOAJ, Inject 140 mg into the skin every 30 days, Disp: , Rfl:     rosuvastatin (CRESTOR) 5 MG tablet, Take 5 mg by mouth daily, Disp: , Rfl:     fluticasone (FLONASE) 50 MCG/ACT nasal spray, , Disp: , Rfl: 11    levocetirizine (XYZAL) 5 MG tablet, Take 5 mg by mouth daily , Disp: , Rfl: 4    montelukast (SINGULAIR) 10 MG tablet, Take 10 mg by mouth daily , Disp: , Rfl: 11    meclizine (ANTIVERT) 25 MG tablet, TAKE 1 TABLET BY MOUTH THREE TIMES DAILY, Disp: 30 tablet, Rfl: 1   tenderness, deformity or injury. Range of motion is normal and pain-free. There is no gross instability. There are no rashes, ulcerations or lesions. Strength and tone are normal. No atrophy or abnormal movements are noted. · LEFT UPPER EXTREMITY: Inspection/examination of the left upper extremity does not show any tenderness, deformity or injury. Range of motion is normal and pain-free except limited in the shoulder. There is no gross instability. There are no rashes, ulcerations or lesions. Strength and tone are normal. No atrophy or abnormal movements are noted. Diagnostic Testing:    MR Lumbar spine shows    1. Transitional vertebral body at lumbosacral junction labeled L5 for the purpose of this study     and to correspond to prior study.  See labeled key images. 2. L4-5 diffuse spondylotic disc displacement. Mild to moderate facet hypertrophy. Mild left    lateral recess stenosis. Abutment of exiting right L4 nerve root. Minor left foramen stenosis. No substantial change. 3. L3-4 mild diffuse spondylotic disc displacement. Tiny left lateral annular tear. Moderate    facet hypertrophy with small left greater than right facet effusions. Gentle abutment of    exiting right L3 nerve root. No change.           .risr  Results for orders placed or performed in visit on 08/25/20   COVID-19   Result Value Ref Range    SARS-CoV-2, LAYO NOT DETECTED NOT DETECTED     Impression:       1. Cervical spondylosis without myelopathy    2. Lumbar foraminal stenosis    3. DDD (degenerative disc disease), cervical    4. Foraminal stenosis of cervical region        Plan:  Clinical Course: Above diagnoses are worsening    I discussed the diagnosis and the treatment options with Fadia Lechuga today. In Summary:  The various treatment options were outlined and discussed with Fadia Lechuga including:  Conservative care options: physical therapy, ice, medications, bracing, and activity modification.  The indications for therapeutic injections. The indications for additional imaging/laboratory studies. The indications for (possible future) interventions. After considering the various options discussed, Amina Leon elected to pursue a course of treatment that includes the followin. Medications:  No further recommendations for new medications. 2. PT:  Encouraged to continue with Home exercise program.    3. Further studies: No further studies. 4. Interventional:  We have discussed options such as radiofrequency ablation after undergoing 2 successful lumbar medial branch blocks. Risks include but limited to bleeding, infection, neuritis, increased pain, lack of pain relief, motor nerve injury. This does not include all possible risks. The patient verbalized understanding would like to proceed. Side effects and benefits were also discussed. Proceed with left C3,C4, C5 and C6 medial branch RFA. Will setup for lumbar SUSHILA bilateral L4 TF approach after she has been released from Dr Mara Jones.    5. Follow up:  2-3 weeks      Amina Leon was instructed to call the office if her symptoms worsen or if new symptoms appear prior to the next scheduled visit. She is specifically instructed to contact the office between now & her scheduled appointment if she has concerns related to her condition or if she needs assistance in scheduling the above tests. She is welcome to call for an appointment sooner if she has any additional concerns or questions. Katy Castellanos MD, BIANKA, [facility]  Board Certified in [address] [place] [address] Certified and Fellowship Trained in [facility] ([facility])             This dictation was performed with a verbal recognition program [facility]) and it was checked for errors. It is possible that there are still dictated errors within this office note. If so, please bring any errors to my attention for an addendum.  All efforts were made to ensure that this office note is accurate. Patient has verbally accepted the following: We confirm that, for purposes of billing, this is a virtual visit with the provider for which we will submit a claim for reimbursement with the insurance company. The patient accepts responsibility for any co-pays, coinsurance amounts or other amounts not covered by the insurance company. Patient location: Home  Physician location: [facility] office  Time spent: not billed by time  Present on the call: myself and patient    Pursuant to the emergency declaration under the [facility], 1135 waiver authority and the [employer] and [employer] Act, this Virtual  Visit was conducted, with patient's consent, to reduce the patient's risk of exposure to COVID-19 and provide continuity of care for an established patient. Services were provided through a video synchronous discussion virtually to substitute for in-person clinic visit. We have confirmed that, for purposes of billing, this is a virtual visit with for which we will submit a claim for reimbursement with your insurance company. The patient has accepted responsibility for any copays, coinsurance amounts or other amounts not covered by their insurance company. This visit was completed virtually using doxy. me.

## 2020-10-02 NOTE — LETTER
Please schedule the following with:     Date:   10/19/20 @ 10:30   Account: [de-identified]  Patient: Samina Flynn    : 1965  Address:  [address] [address]    Phone (H):  [phone] (home) [phone] (work)     ----------------------------------------------------------------------------------------------  Diagnosis:     ICD-10-CM    1. Cervical spondylosis without myelopathy  M47.812    2. Lumbar foraminal stenosis  M48.061    3. DDD (degenerative disc disease), cervical  M50.30    4. Foraminal stenosis of cervical region  M48.02          Levels:Left C3, C4, C5 and C6 medial branch RFA  CPT Codes [id number], 87515    ----------------------------------------------------------------------------------------------  Injection #    [id number]  Attending Physician       Quincy Mohr.  Kaela Barcenas MD.  ----------------------------------------------------------------------------------------------  Injection Scheduled For:    At:    [address] [facility]    Pre-Cert#    2nd Insurance     Pre-Cert#    Comments or Special instructions:    COVID TEST: no    · Infection control  · Tested positive for MRSA in past 12 months:  no  · Tested positive for MSSA \"staph infection\" in past 12 months: no  · Tested positive for VRE (Vancomycin Resistant Enterococci) in past 12 months:   no  · Currently on any antibiotics for an infection: no  · Anticoagulants:  · On a blood thinner:  no   · Any history of bleeding disorder: no   · Advanced Liver disease: no   · Advanced Renal disease: no   · Glaucoma: no   · Diabetes: no     Sedation:  Yes  -----------------------------------------------------------------------------------------------  Allergies   Allergen Reactions    Ibuprofen      Only has one kindney

## 2020-10-07 ENCOUNTER — TELEPHONE (OUTPATIENT)
Dept: ORTHOPEDIC SURGERY | Age: 55
End: 2020-10-07

## 2020-10-07 NOTE — TELEPHONE ENCOUNTER
Auth: # [id number]    Date: 10/19/2020  Type of SX:  Outpatient Neurotomy  Location: [facility]  CPT: 49790, 20443   DX Code: W32.487, M48.061, M50.30, M48.02  SX area: Cervical Spine  Insurance: [facility]    CPT: 93605 25, 88119

## 2020-10-14 NOTE — PROGRESS NOTES
PATIENT REACHED   YES_X___NO____    PREOP INSTUCTIONS LEFT ON VM NUMBER_______________  There is a one visitor policy at [facility] for all surgeries and endoscopies. Whether the visitor can stay or will be asked to wait in the car will depend on the current policy and if social distancing can be maintained. The policy is subject to change at any time. Please make sure the visitor has a cell phone that is on,charged and able to accept calls, as this may be the way that the staff communicates with them. Pain management is NO VISITOR policyThe patients ride is expected to remain in the car with a cell phone for communication. If the ride is leaving the hospital grounds please make sure they are back in time for pickup. Have the patient inform the staff on arrival what their rides plans are while the patient is in the facility. At the MAIN there is one visitor allowed. Please note that the visitor policy is subject to change. DATE_10/19/20________ TIME__1030_______ARRIVAL_0930_______PLACE__masc__________  NOTHING TO EAT OR DRINK  AFTER MIDNIGHT THE EVENING PRIOR OR AS INSTRUCTED BY YOUR DR.  Maribel Steel NEED A RESPONSIBLE ADULT AGE 18 OR OLDER TO DRIVE YOU HOME  PLEASE BRING INSURANCE CARD. PICTURE ID AND COMPLETE LIST OF MEDS  WEAR LOOSE COMFORTABLE CLOTHING  FOLLOW ANY INSTRUCTIONS YOUR DRS OFFICE HAS GIVEN YOU,INCLUDING WHAT MEDICATIONS TO TAKE THE AM OF PROCEDURE AND WHEN AND IF YOU NEED TO STOP ANY BLOOD THINNERS. IF YOU HAVE QUESTIONS REGARDING THIS CALL THE OFFICE  THE GOAL BLOOD SUGAR THE AM OF PROCEDURE  OR LESS ABOVE THAT THE PROCEDURE MAY BE CANCELLED  ANY QUESTIONS CALL YOUR DOCTOR. ALSO,PLEASE READ THE INSTRUCTION PACKET FROM YOUR DR IF YOU RECEIVED ONE.   SPINE INTERVENTION NUMBER [phone]

## 2020-10-19 ENCOUNTER — HOSPITAL ENCOUNTER (OUTPATIENT)
Age: 55
Setting detail: OUTPATIENT SURGERY
Discharge: HOME | End: 2020-10-19
Attending: PHYSICAL MEDICINE & REHABILITATION | Admitting: PHYSICAL MEDICINE & REHABILITATION
Payer: COMMERCIAL

## 2020-10-19 ENCOUNTER — APPOINTMENT (OUTPATIENT)
Dept: GENERAL RADIOLOGY | Age: 55
End: 2020-10-19
Attending: PHYSICAL MEDICINE & REHABILITATION
Payer: COMMERCIAL

## 2020-10-19 VITALS
OXYGEN SATURATION: 100 % | BODY MASS INDEX: 23.92 KG/M2 | DIASTOLIC BLOOD PRESSURE: 76 MMHG | SYSTOLIC BLOOD PRESSURE: 108 MMHG | TEMPERATURE: 98.2 F | WEIGHT: 135 LBS | HEIGHT: 63 IN | HEART RATE: 78 BPM | RESPIRATION RATE: 16 BRPM

## 2020-10-19 PROCEDURE — 2709999900 HC NON-CHARGEABLE SUPPLY: Performed by: PHYSICAL MEDICINE & REHABILITATION

## 2020-10-19 PROCEDURE — 3209999900 FLUORO FOR SURGICAL PROCEDURES

## 2020-10-19 PROCEDURE — 99153 MOD SED SAME PHYS/QHP EA: CPT | Performed by: PHYSICAL MEDICINE & REHABILITATION

## 2020-10-19 PROCEDURE — 6360000002 HC RX W HCPCS: Performed by: PHYSICAL MEDICINE & REHABILITATION

## 2020-10-19 PROCEDURE — 3610000059 HC PAIN LEVEL 5 ADDL 15 MIN (NON-OR): Performed by: PHYSICAL MEDICINE & REHABILITATION

## 2020-10-19 PROCEDURE — 99152 MOD SED SAME PHYS/QHP 5/>YRS: CPT | Performed by: PHYSICAL MEDICINE & REHABILITATION

## 2020-10-19 PROCEDURE — 3610000058 HC PAIN LEVEL 5 BASE (NON-OR): Performed by: PHYSICAL MEDICINE & REHABILITATION

## 2020-10-19 PROCEDURE — 2500000003 HC RX 250 WO HCPCS: Performed by: PHYSICAL MEDICINE & REHABILITATION

## 2020-10-19 RX ORDER — FENTANYL CITRATE 50 UG/ML
INJECTION, SOLUTION INTRAMUSCULAR; INTRAVENOUS
Status: COMPLETED | OUTPATIENT
Start: 2020-10-19 | End: 2020-10-19

## 2020-10-19 RX ORDER — LIDOCAINE HYDROCHLORIDE 10 MG/ML
INJECTION, SOLUTION EPIDURAL; INFILTRATION; INTRACAUDAL; PERINEURAL
Status: COMPLETED | OUTPATIENT
Start: 2020-10-19 | End: 2020-10-19

## 2020-10-19 RX ORDER — LIDOCAINE HYDROCHLORIDE 20 MG/ML
INJECTION, SOLUTION EPIDURAL; INFILTRATION; INTRACAUDAL; PERINEURAL
Status: COMPLETED | OUTPATIENT
Start: 2020-10-19 | End: 2020-10-19

## 2020-10-19 RX ORDER — MIDAZOLAM HYDROCHLORIDE 1 MG/ML
INJECTION INTRAMUSCULAR; INTRAVENOUS
Status: COMPLETED | OUTPATIENT
Start: 2020-10-19 | End: 2020-10-19

## 2020-10-19 ASSESSMENT — PAIN SCALES - GENERAL
PAINLEVEL_OUTOF10: 0
PAINLEVEL_OUTOF10: 0

## 2020-10-19 ASSESSMENT — PAIN DESCRIPTION - DESCRIPTORS: DESCRIPTORS: ACHING;BURNING

## 2020-10-19 NOTE — H&P
HISTORY AND PHYSICAL/PRE-SEDATION ASSESSMENT    Patient:  Jon Pittir   :  1965  Medical Record No.:  [id number]   Date:  10/19/2020  Physician:  Mary Hare M.D. Facility: [address] [place] [address]    HISTORY OF PRESENT ILLNESS:                 The patient is a 63 y.o. female whom presents with neck pain. Review of the imaging and physical exam of the patient confirmed the pre-procedure diagnosis. After a thorough discussion of risks, benefits and alternatives informed consent was obtained.                 Past Medical History:   Past Medical History:   Diagnosis Date    Hyperlipemia     Migraines       Past Surgical History:     Past Surgical History:   Procedure Laterality Date    KIDNEY DONATION Left     KNEE SURGERY  bilateral    ACL repair     NERVE SURGERY Right 1/15/2019    RIGHT LUMBAR TWO, LUMBAR THREE, LUMBAR FOUR, LUMBAR FIVE DORSAL RAMUS RADIOFREQUENCY ABLATION SITE CONFIRMED BY FLUOROSCOPY performed by Mary Hare MD at [address] [facility] Right 2019    RIGHT LUMBAR TWO, LUMBAR THREE, LUMBAR FOUR, LUMBAR FIVE DORSAL RAMUS RADIOFREQUENCY ABLATION SITE CONFIRMED BY FLUOROSCOPY performed by Mary Hare MD at [address] [facility] Right 2020    RIGHT LUMBAR TWO, LUMBAR THREE, LUMBAR FOUR, LUMBAR FIVE DORSAL RAMUS RADIOFREQUENCY ABLATION SITE CONFIRMED BY FLUOROSCOPY performed by Mary Hare MD at [address] [facility] Right 2020    RIGHT LUMBAR TWO, LUMBAR THREE, LUMBAR FOUR, AND LUMBAR FIVE MEDIAL BRANCH RADIOFREQUENCY ABLATION SITE CONFIRMED BY FLUOROSCOPY performed by Mary Hare MD at [address] [facility] Left 2020    LEFT CERVICAL THREE, CERVICAL FOUR, CERVICAL FIVE, CERVICAL SIX MEDIAL BRANCH BLOCK SITE CONFIRMED BY FLUOROSCOPY performed by Mary Hare MD at [address] [facility] 2020    LEFT CERVICAL THREE CERVICAL FOUR CERVICAL FIVE CERVICAL SIX MEDIAL BRANCH BLOCKS SITE CONFIRMED BY FLUOROSCOPY performed by Alfonso Mulligan MD at [place] [address]     Current Medications:   Prior to Admission medications    Medication Sig Start Date End Date Taking? Authorizing Provider   meloxicam (MOBIC) 15 MG tablet Take 15 mg by mouth daily    Historical Provider, MD   Erenumab-aooe (AIMOVIG 140 DOSE) 70 MG/ML SOAJ Inject 140 mg into the skin every 30 days    Historical Provider, MD   rosuvastatin (CRESTOR) 5 MG tablet Take 5 mg by mouth daily    Historical Provider, MD   fluticasone (FLONASE) 50 MCG/ACT nasal spray  9/15/15   Historical Provider, MD   levocetirizine (XYZAL) 5 MG tablet Take 5 mg by mouth daily  11/9/15   Historical Provider, MD   montelukast (SINGULAIR) 10 MG tablet Take 10 mg by mouth daily  11/9/15   Historical Provider, MD   meclizine (ANTIVERT) 25 MG tablet TAKE 1 TABLET BY MOUTH THREE TIMES DAILY 4/17/14   Isauro Paredes DO     Allergies:  Ibuprofen  Social History:    reports that she has never smoked. She has never used smokeless tobacco. She reports current alcohol use. She reports that she does not use drugs. Family History:   Family History   Problem Relation Age of Onset    Osteoarthritis Mother     Osteoarthritis Father        Vitals: Blood pressure 108/72, pulse 82, temperature 98.2 °F (36.8 °C), temperature source Temporal, resp. rate 18, height 5' 3\" (1.6 m), weight 135 lb (61.2 kg), last menstrual period 08/04/2016, SpO2 99 %, not currently breastfeeding. PHYSICAL EXAM:including affected areas  HENT: Airway patent and reviewed  Cardiovascular: Normal rate, regular rhythm, normal heart sounds. Pulmonary/Chest: No wheezes. No rhonchi. No rales. Abdominal: Soft. Bowel sounds are normal. No distension.   Extremities: Moves all extremities equally  Cervical and Lumbar Spine: Painful range of motion, no midline tenderness       Diagnosis:Cervical spondylosis without radiculopathy  M47.812  M48.061  M50.30 M48.02    Plan: Proceed with planned procedure      ASA CLASS:         []   I. Normal, healthy adult           [x]   II.  Mild systemic disease            []   III. Severe systemic disease      Mallampati: Mallampati Class II - (soft palate, fauces & uvula are visible)      Sedation plan:   [x]  Local              []  Minimal                  []  General anesthesia    Patient's condition acceptable for planned procedure/sedation. Post Procedure Plan   Return to same level of care   ______________________     The patient was counseled at length about the risks of slade Covid-19 in the pj-operative and post-operative states including the recovery window of their procedure. The patient was made aware that slade Covid-19 after a surgical procedure may worsen their prognosis for recovering from the virus and lend to a higher morbidity and or mortality risk. The patient was given the options of postponing their procedure. All of the risks, benefits, and alternatives were discussed. The patient does wish to proceed with the procedure. The risks and benefits as well as alternatives to the procedure have been discussed with the patient and or family. The patient and or next of kin understands and agrees to proceed.     Jean Pierre Light M.D.

## 2020-10-19 NOTE — PROGRESS NOTES
IV discontinued, catheter intact, and dressing applied. Procedural dressing dry and intact. Bilateral upper extremities equal in strength. Discharge instructions reviewed with patient or responsible adult, signed and copy given. All home medications have been reviewed. All questions answered and patient or responsible adult verbalized understanding.   PAIN LEVEL AT DISCHARGE __0___

## 2020-10-19 NOTE — OP NOTE
Patient:  Jackie Bailey  YOB: 1965  Medical Record #:  [id number]   Place:   [address] [place] [address]  Date:  10/19/2020   Physician:  Annika Zurita MD    Procedure: Radiofrequency ablation of the Left C3, C4, C5, and C6 medial branches. Pre-Procedure Diagnosis: Cervical spondylosis without radiculopathy     Post-Procedure Diagnosis: Same     Sedation: Local with 1% Lidocaine 5 ml and 1 of IV Versed with 25 mcg of IV Fentanyl      EBL: None     Complications: None     Procedure Summary:     The patient was seen in the office for complaints of neck pain. Review of the imaging and physical exam of the patient confirmed the pre-procedure diagnosis. After a thorough discussion of risks, benefits and alternatives informed consent was obtained. The patient was brought to the procedure suite and placed in the prone position. The skin overlying the cervical spine was prepped with chloraprep and draped in the usual sterile fashion. Using fluoroscopic guidance, the left C3, C4, C5, and C6 levels were identified. Through anesthetized skin, a 20 gauge 100 mm RFL needle with a 10 mm active tip was advanced to the juncture of the superior articular process and the transverse process at the left C3 level where the left C3 medial branch resides. After the probe was instilled, motor testing took place up to 2 V without any paresthesia into the left arm. Then ablation took place at 80 C for 90 seconds. This was repeated for the left C4, C5, and C6 levels corresponding to the left C4, C5, and C6 medial branches. Following the ablation 2.5 mg of dexamethasone mixed with 0.25% Marcaine was instilled at each level. The needles were removed and a band-aid was applied at each level. The patient was transferred to the post-operative area in stable condition.

## 2020-11-20 ENCOUNTER — VIRTUAL VISIT (OUTPATIENT)
Dept: ORTHOPEDIC SURGERY | Age: 55
End: 2020-11-20
Payer: COMMERCIAL

## 2020-11-20 PROCEDURE — 1036F TOBACCO NON-USER: CPT | Performed by: PHYSICAL MEDICINE & REHABILITATION

## 2020-11-20 PROCEDURE — G8484 FLU IMMUNIZE NO ADMIN: HCPCS | Performed by: PHYSICAL MEDICINE & REHABILITATION

## 2020-11-20 PROCEDURE — 3017F COLORECTAL CA SCREEN DOC REV: CPT | Performed by: PHYSICAL MEDICINE & REHABILITATION

## 2020-11-20 PROCEDURE — 99213 OFFICE O/P EST LOW 20 MIN: CPT | Performed by: PHYSICAL MEDICINE & REHABILITATION

## 2020-11-20 PROCEDURE — G8420 CALC BMI NORM PARAMETERS: HCPCS | Performed by: PHYSICAL MEDICINE & REHABILITATION

## 2020-11-20 PROCEDURE — G8427 DOCREV CUR MEDS BY ELIG CLIN: HCPCS | Performed by: PHYSICAL MEDICINE & REHABILITATION

## 2020-11-20 NOTE — PROGRESS NOTES
Follow up: [facility] visit (Audio/visual connection present)    Kristie Lazar  1965  [id number]         Chief Complaint   Patient presents with    Neck Pain     10/19/2020: RFA Left C3, C4, C5, and C6 MB         HISTORY OF PRESENT ILLNESS:   Ms. Amy Banuelos is a 63 y.o. female returns for a follow up visit for multiple medical problems. Her current presenting problems are No diagnosis found. .    As per information/history obtained from the PADT(patient assessment and documentation tool) - She complains of pain in the neck with radiation to the shoulders Left She rates the pain 3/10 and describes it as throbbing. Pain is made worse by: movement. She denies side effects from the current pain regimen. Patient reports that since the last follow up visit the physical functioning is better, family/social relationships are better, mood is better and sleep patterns are better, and that the overall functioning is better. Patient denies neurological bowel or bladder. Presents after undergoing radiofrequency ablation of the left C3-C4-C5 and C6 levels. She reports 100% relief of her pain up until the last 2 weeks when she has been recovering from her recent left shoulder surgery. Associated signs and symptoms:   Neurogenic bowel or bladder symptoms:  no   Perceived weakness:  no   Difficulty walking:  no            Past medical, surgical, social and family history reviewed with the patient.  No pertinent relevant history  Past Medical History:   Past Medical History:   Diagnosis Date    Hyperlipemia     Migraines       Past Surgical History:     Past Surgical History:   Procedure Laterality Date    KIDNEY DONATION Left     KNEE SURGERY  bilateral    ACL repair     NERVE SURGERY Right 1/15/2019    RIGHT LUMBAR TWO, LUMBAR THREE, LUMBAR FOUR, LUMBAR FIVE DORSAL RAMUS RADIOFREQUENCY ABLATION SITE CONFIRMED BY FLUOROSCOPY performed by Salvador Carpio MD at [address] [facility] Right 8/6/2019 use. She reports that she does not use drugs. Family History:   Family History   Problem Relation Age of Onset    Osteoarthritis Mother     Osteoarthritis Father        REVIEW OF SYSTEMS:   CONSTITUTIONAL: Denies unexplained weight loss, fevers, chills or fatigue  NEUROLOGICAL: Denies unsteady gait or progressive weakness  MUSCULOSKELETAL: Denies joint swelling or redness  GI: Denies nausea, vomiting, diarrhea   : Denies bowel or bladder issues       PHYSICAL EXAM:  Limitations present due to Telehealth  Vitals:   Patient-Reported Vitals 11/20/2020   Patient-Reported Weight 61.2KG   Patient-Reported Height 1.6M   Patient-Reported Pulse 70   Patient-Reported Temperature 97.6          GENERAL EXAM:  · General Apparence: Patient is adequately groomed with no evidence of malnutrition. · Psychiatric: Orientation: The patient is oriented to time, place and person. The patient's mood and affect are appropriate   · Vascular: Examination reveals no swelling and palpation reveals no tenderness in upper extremities. Good capillary refill. Sensation is intact without deficit in the upper extremities to light touch   · Coordination of the upper extremities are normal.    CERVICAL EXAMINATION:  · Inspection: Local inspection shows no step-off or bruising. Cervical alignment is normal.   · Palpation by patient: No evidence of tenderness at the midline. Paraspinal tenderness is not present. There is no paraspinal spasm. · Range of Motion:  limited by 25% in all planes due to pain   · Strength: Strength testing is at least 4/5 bilateral upper extremities based on visual exam  · Skin:There are no rashes, ulcerations or lesions. · Gait & station:  normal, patient ambulates without assistance and no ataxia  · Additional Examinations:  · RIGHT UPPER EXTREMITY:  Inspection/examination of the right upper extremity does not show any tenderness, deformity or injury. Range of motion is normal and pain-free.  There is no gross condition or if she needs assistance in scheduling the above tests. She is welcome to call for an appointment sooner if she has any additional concerns or questions. Edgar Ng. Hina Riley MD, BIANKA, [facility]  Board Certified in [address] [place] [address] Certified and Fellowship Trained in [facility] ([facility])             This dictation was performed with a verbal recognition program [facility]) and it was checked for errors. It is possible that there are still dictated errors within this office note. If so, please bring any errors to my attention for an addendum. All efforts were made to ensure that this office note is accurate. Patient has verbally accepted the following: We confirm that, for purposes of billing, this is a virtual visit with the provider for which we will submit a claim for reimbursement with the insurance company. The patient accepts responsibility for any co-pays, coinsurance amounts or other amounts not covered by the insurance company. Patient location: home  Physician location: [facility] office  Time spent: not billed by time  Present on the call: myself and patient    Pursuant to the emergency declaration under the [facility], 1135 waiver authority and the [employer] Act, this Virtual  Visit was conducted, with patient's consent, to reduce the patient's risk of exposure to COVID-19 and provide continuity of care for an established patient. Services were provided through a video synchronous discussion virtually to substitute for in-person clinic visit. We have confirmed that, for purposes of billing, this is a virtual visit with for which we will submit a claim for reimbursement with your insurance company.  The patient has accepted responsibility for any copays, coinsurance amounts or other amounts not covered by their insurance company. This visit was completed virtually using doxy. me.

## 2021-10-12 ENCOUNTER — OFFICE VISIT (OUTPATIENT)
Dept: URGENT CARE | Age: 56
End: 2021-10-12
Payer: COMMERCIAL

## 2021-10-12 VITALS
SYSTOLIC BLOOD PRESSURE: 125 MMHG | TEMPERATURE: 98.7 F | WEIGHT: 135 LBS | RESPIRATION RATE: 16 BRPM | DIASTOLIC BLOOD PRESSURE: 65 MMHG | OXYGEN SATURATION: 96 % | BODY MASS INDEX: 23.92 KG/M2 | HEART RATE: 71 BPM | HEIGHT: 63 IN

## 2021-10-12 DIAGNOSIS — M25.551 ACUTE PAIN OF RIGHT HIP: Primary | ICD-10-CM

## 2021-10-12 DIAGNOSIS — M25.571 ACUTE RIGHT ANKLE PAIN: ICD-10-CM

## 2021-10-12 PROCEDURE — S8451 SPLINT WRIST OR ANKLE: HCPCS | Performed by: PHYSICIAN ASSISTANT

## 2021-10-12 PROCEDURE — 99204 OFFICE O/P NEW MOD 45 MIN: CPT | Performed by: PHYSICIAN ASSISTANT

## 2021-10-12 ASSESSMENT — ENCOUNTER SYMPTOMS
VOMITING: 0
NAUSEA: 0
CHEST TIGHTNESS: 0
ABDOMINAL PAIN: 0
SHORTNESS OF BREATH: 0
BACK PAIN: 0
DIARRHEA: 0

## 2021-10-12 NOTE — PROGRESS NOTES
abdominal pain, diarrhea, nausea and vomiting. Genitourinary: Negative for difficulty urinating, dysuria and hematuria. Musculoskeletal: Positive for arthralgias, gait problem and joint swelling. Negative for back pain. Neurological: Negative for dizziness, syncope and headaches. Vitals:    10/12/21 1018   BP: 125/65   Pulse: 71   Resp: 16   Temp: 98.7 °F (37.1 °C)   SpO2: 96%   Weight: 135 lb (61.2 kg)   Height: 5' 3\" (1.6 m)        Physical Exam  Constitutional:       General: She is not in acute distress. Appearance: Normal appearance. She is normal weight. She is not ill-appearing. HENT:      Head: Normocephalic and atraumatic. Eyes:      Conjunctiva/sclera: Conjunctivae normal.   Pulmonary:      Effort: Pulmonary effort is normal.   Musculoskeletal:      Cervical back: Normal range of motion. Right hip: Tenderness (greater troch/ posterior thigh) and bony tenderness (greater trochanter) present. No deformity or lacerations. Normal range of motion. Normal strength. Left hip: Normal.      Right lower leg: Tenderness (distal 3rd of tibia/fibula) present. No swelling. No edema. Left lower leg: Normal.      Right ankle: Swelling (significant, localized to lateral malleolus) present. No ecchymosis or lacerations. Tenderness present over the lateral malleolus. No medial malleolus tenderness. Decreased range of motion (most significant with plantar flexion). Abnormal pulses: CR<2s. Left ankle: Abnormal pulses: CR<2s. Right foot: Normal capillary refill. Tenderness (lateral foot just distal to lat malleolus) present. No swelling, deformity or laceration. Left foot: Normal. Normal capillary refill. Skin:     General: Skin is warm and dry. Capillary Refill: Capillary refill takes less than 2 seconds. Neurological:      General: No focal deficit present. Mental Status: She is alert and oriented to person, place, and time. Sensory: Sensation is intact.  No sensory deficit.       Gait: Gait abnormal.   Psychiatric:         Mood and Affect: Mood normal.         Behavior: Behavior normal.         Electronically signed by HEATH Ortez on 10/12/2021 at 12:37 PM

## 2021-10-12 NOTE — PATIENT INSTRUCTIONS
Get x-rays - we will call with results  Follow-up with orthopedics  Rest; complete non-weightbearing of ankle until x-ray results  Ice affected areas  Keep ankle compressed to increase stability and decrease swelling

## 2021-10-15 ENCOUNTER — OFFICE VISIT (OUTPATIENT)
Dept: ORTHOPEDIC SURGERY | Age: 56
End: 2021-10-15
Payer: COMMERCIAL

## 2021-10-15 VITALS — BODY MASS INDEX: 23.92 KG/M2 | TEMPERATURE: 97.8 F | HEIGHT: 63 IN | WEIGHT: 135 LBS

## 2021-10-15 DIAGNOSIS — S70.11XA CONTUSION OF RIGHT HIP AND THIGH, INITIAL ENCOUNTER: ICD-10-CM

## 2021-10-15 DIAGNOSIS — M25.572 ACUTE LEFT ANKLE PAIN: Primary | ICD-10-CM

## 2021-10-15 DIAGNOSIS — S70.01XA CONTUSION OF RIGHT HIP AND THIGH, INITIAL ENCOUNTER: ICD-10-CM

## 2021-10-15 DIAGNOSIS — S93.401A SPRAIN OF RIGHT ANKLE, UNSPECIFIED LIGAMENT, INITIAL ENCOUNTER: ICD-10-CM

## 2021-10-15 PROCEDURE — 3017F COLORECTAL CA SCREEN DOC REV: CPT | Performed by: ORTHOPAEDIC SURGERY

## 2021-10-15 PROCEDURE — L1902 AFO ANKLE GAUNTLET PRE OTS: HCPCS | Performed by: ORTHOPAEDIC SURGERY

## 2021-10-15 PROCEDURE — G8420 CALC BMI NORM PARAMETERS: HCPCS | Performed by: ORTHOPAEDIC SURGERY

## 2021-10-15 PROCEDURE — G8484 FLU IMMUNIZE NO ADMIN: HCPCS | Performed by: ORTHOPAEDIC SURGERY

## 2021-10-15 PROCEDURE — 99204 OFFICE O/P NEW MOD 45 MIN: CPT | Performed by: ORTHOPAEDIC SURGERY

## 2021-10-15 PROCEDURE — G8427 DOCREV CUR MEDS BY ELIG CLIN: HCPCS | Performed by: ORTHOPAEDIC SURGERY

## 2021-10-15 PROCEDURE — 1036F TOBACCO NON-USER: CPT | Performed by: ORTHOPAEDIC SURGERY

## 2021-10-15 NOTE — PROGRESS NOTES
Chief complaint right hip pain and right ankle pain. Patient presents for evaluation of right hip right ankle pain. She was playing [employer] ball on October 11 when she fell. She twisted her right ankle and fell onto her right hip/buttock. She immediately had anterolateral ankle pain and swelling she was seen at urgent care x-rays were taken and were negative she was placed in an Aircast U-splint reports the swelling is gone down bruising and has for the most part resolved she describes primarily discomfort over the lateral aspect of her ankle she does not feel like the U-splint is providing much support. She is anxious to return to pickleball. With regard to her hip, she did not develop pain until the next day. Describes pain more in the buttock region over the lateral aspect of left hip. Does not have any pain with weightbearing.          Past Medical History:   Diagnosis Date    Hyperlipemia     Migraines         Past Surgical History:   Procedure Laterality Date    KIDNEY DONATION Left     KNEE SURGERY  bilateral    ACL repair     NERVE SURGERY Right 1/15/2019    RIGHT LUMBAR TWO, LUMBAR THREE, LUMBAR FOUR, LUMBAR FIVE DORSAL RAMUS RADIOFREQUENCY ABLATION SITE CONFIRMED BY FLUOROSCOPY performed by Jeanine Adhikari MD at [address] Right 8/6/2019    RIGHT LUMBAR TWO, LUMBAR THREE, LUMBAR FOUR, LUMBAR FIVE DORSAL RAMUS RADIOFREQUENCY ABLATION SITE CONFIRMED BY FLUOROSCOPY performed by Jeanine Adhikari MD at [address] Right 2/18/2020    RIGHT LUMBAR TWO, LUMBAR THREE, LUMBAR FOUR, LUMBAR FIVE DORSAL RAMUS RADIOFREQUENCY ABLATION SITE CONFIRMED BY FLUOROSCOPY performed by Jeanine Adhikari MD at [address] Right 8/11/2020    RIGHT LUMBAR TWO, LUMBAR THREE, LUMBAR FOUR, AND LUMBAR FIVE MEDIAL BRANCH RADIOFREQUENCY ABLATION SITE CONFIRMED BY FLUOROSCOPY performed by Jeanine Adhikari MD at [address] [facility] Left 9/1/2020    LEFT CERVICAL THREE, CERVICAL FOUR, CERVICAL FIVE, CERVICAL SIX MEDIAL BRANCH BLOCK SITE CONFIRMED BY FLUOROSCOPY performed by Jeanine Adhikari MD at [address] Left 9/29/2020    LEFT CERVICAL THREE CERVICAL FOUR CERVICAL FIVE CERVICAL SIX MEDIAL BRANCH BLOCKS SITE CONFIRMED BY FLUOROSCOPY performed by Jeanine Adhikari MD at [address] PAIN MANAGEMENT PROCEDURE Left 10/19/2020    LEFT C3, C4, C5 AND C6 MEDIAL BRANCH RADIOFREQUENCY ABLATION WITH FLUOROSCOPY performed by Jeanine Adhikari MD at [facility] History   Problem Relation Age of Onset    Osteoarthritis Mother     Osteoarthritis Father        Social History     Socioeconomic History    Marital status:      Spouse name: None    Number of children: None    Years of education: None    Highest education level: None   Occupational History    None   Tobacco Use    Smoking status: Never Smoker    Smokeless tobacco: Never Used   Substance and Sexual Activity    Alcohol use: Yes     Alcohol/week: 0.0 standard drinks     Comment: Rarely    Drug use: No    Sexual activity: None   Other Topics Concern    None   Social History Narrative    None     Social Determinants of Health     Financial Resource Strain:     Difficulty of Paying Living Expenses:    Food Insecurity:     Worried About Running Out of Food in the Last Year:     Ran Out of Food in the Last Year:    Transportation Needs:     Lack of Transportation (Medical):      Lack of Transportation (Non-Medical):    Physical Activity:     Days of Exercise per Week:     Minutes of Exercise per Session:    Stress:     Feeling of Stress :    Social Connections:     Frequency of Communication with Friends and Family:     Frequency of Social Gatherings with Friends and Family:     Attends Holiness Services:     Active Member of Clubs or Organizations:     Attends Club or Organization Meetings:     Marital Status:    Intimate Partner Violence:     Fear of Current or Ex-Partner:     Emotionally Abused:     Physically Abused:     Sexually Abused:        Current Outpatient Medications   Medication Sig Dispense Refill    meloxicam (MOBIC) 15 MG tablet Take 15 mg by mouth daily      Erenumab-aooe (AIMOVIG 140 DOSE) 70 MG/ML SOAJ Inject 140 mg into the skin every 30 days      rosuvastatin (CRESTOR) 5 MG tablet Take 5 mg by mouth daily      levocetirizine (XYZAL) 5 MG tablet Take 5 mg by mouth daily   4    montelukast (SINGULAIR) 10 MG tablet Take 10 mg by mouth daily   11    meclizine (ANTIVERT) 25 MG tablet TAKE 1 TABLET BY MOUTH THREE TIMES DAILY 30 tablet 1     No current facility-administered medications for this visit. Allergies   Allergen Reactions    Ibuprofen      Only has one kindney       Vital signs:  [facility] 08/04/2016        Examination patient's right hip shows full range of motion with no pain with forced internal and external rotation of the hip. She has mild tenderness over the greater trochanter. She has some mild tenderness over the piriformis. She has no pain with resisted hamstring function. She is able to do a straight leg raise and to stand on one leg without difficulty. Regard to her ankle she has some resolving ecchymosis of lateral aspect of her ankle she has 2+ anterior drawer sign tenderness over the anterolateral ankle ligaments. She has mild swelling. She is intact neurovascular. I reviewed the outside x-ray reports for both her hip and her ankle. They are both negative for bony or soft tissue abnormalities. Right hip diagnosis is right hip contusion with greater trochanteric bursitis. Right ankle diagnosis is lateral ankle sprain. Plan we gave the patient and ASO ankle brace and referred to physical therapy for both her hip and her knee. She was advised with regard to her injuries as well as given guidelines for returning back into pickleball. Her questions were answered.   She cannot take anti-inflammatory medications because she has 1 kidney. She will use Voltaren gel to the right ankle as needed. She will ice as needed. Follow-up will be with me if her symptoms fail to resolve. The time spent for evaluation treatment of the patient as well as reviewing prior studies was 50 minutes. I personally reviewed the patient's pain scale, review of systems, family history, social history, past medical history, allergies and medications. Review of systems was collected today, reviewed and is included in the medical record. It is available under the media tab. Rosetta Shone, MD  Sports Medicine, Knee and Shoulder Surgery    This dictation was performed with a verbal recognition program [facility]) and it was checked for errors. It is possible that there are still dictated errors within this office note. If so, please bring any errors to my attention for an addendum. All efforts were made to ensure that this office note is accurate.

## (undated) DEVICE — NEEDLE HYPO 18GA L1.5IN PNK POLYPR HUB S STL THN WALL FILL

## (undated) DEVICE — ALCOHOL RUBBING 16OZ 70% ISO

## (undated) DEVICE — AVANOS* UNIVERSAL BLOCK TRAYS: Brand: AVANOS

## (undated) DEVICE — TOWEL OR BLUEE 16X26IN ST 8 PACK ORB08 16X26ORTWL

## (undated) DEVICE — CHLORAPREP 26ML ORANGE

## (undated) DEVICE — PEN: MARKING STD 100/CS: Brand: MEDICAL ACTION INDUSTRIES

## (undated) DEVICE — NEEDLE RF 20GA L10CM TIP L10MM CVD ACT TIP SL

## (undated) DEVICE — PAD GRND NEUT ELECTRD AD DISP

## (undated) DEVICE — STERILE POLYISOPRENE POWDER-FREE SURGICAL GLOVES: Brand: PROTEXIS

## (undated) DEVICE — STERILE DISPOSABLE EQUIPMENT COVER - DOME COVER 22" DEPTH: Brand: PREFERRED MEDICAL PRODUCTS, LLC

## (undated) DEVICE — STANDARD HYPODERMIC NEEDLE,POLYPROPYLENE HUB: Brand: MONOJECT

## (undated) DEVICE — UNIVERSAL BLOCK TRAY: Brand: MEDLINE INDUSTRIES, INC.

## (undated) DEVICE — Z CONVERTED USE 2275871 SPONGE GZ W4XL4IN WHT 8 PLY CURITY

## (undated) DEVICE — SYRINGE MED 10ML LUERLOCK TIP W/O SFTY DISP

## (undated) DEVICE — PORT VLV 2 W NDL FREE SMRTSITE

## (undated) DEVICE — UNIVERSAL BLOCK TRAY: Brand: AVANOS*

## (undated) DEVICE — GAUZE,SPONGE,4"X4",16PLY,STRL,LF,10/TRAY: Brand: MEDLINE

## (undated) DEVICE — DISPOSABLE OR TOWEL: Brand: CARDINAL HEALTH

## (undated) DEVICE — SHEET,DRAPE,53X77,STERILE: Brand: MEDLINE

## (undated) DEVICE — Device: Brand: JELCO